# Patient Record
Sex: FEMALE | Race: WHITE | Employment: FULL TIME | URBAN - METROPOLITAN AREA
[De-identification: names, ages, dates, MRNs, and addresses within clinical notes are randomized per-mention and may not be internally consistent; named-entity substitution may affect disease eponyms.]

---

## 2023-09-17 PROBLEM — E03.9 ACQUIRED HYPOTHYROIDISM: Status: ACTIVE | Noted: 2023-09-17

## 2023-09-17 PROBLEM — E04.1 THYROID NODULE: Status: ACTIVE | Noted: 2023-09-17

## 2023-09-17 PROBLEM — M25.561 RIGHT KNEE PAIN: Status: ACTIVE | Noted: 2023-09-17

## 2023-09-17 PROBLEM — K21.9 ACID REFLUX: Status: ACTIVE | Noted: 2023-09-17

## 2023-09-17 PROBLEM — R00.2 HEART PALPITATIONS: Status: ACTIVE | Noted: 2023-09-17

## 2023-09-17 PROBLEM — M17.0 PRIMARY OSTEOARTHRITIS OF BOTH KNEES: Status: ACTIVE | Noted: 2023-09-17

## 2023-09-17 PROBLEM — M25.562 LEFT KNEE PAIN: Status: ACTIVE | Noted: 2023-09-17

## 2023-09-17 PROBLEM — M19.90 ARTHRITIS: Status: ACTIVE | Noted: 2023-09-17

## 2023-09-17 RX ORDER — LORATADINE 10 MG/1
TABLET ORAL
COMMUNITY
Start: 2022-01-26

## 2023-09-17 RX ORDER — HYALURONATE SODIUM 20 MG/2 ML
SYRINGE (ML) INTRAARTICULAR
COMMUNITY
Start: 2021-07-13

## 2023-09-17 RX ORDER — OMEPRAZOLE 20 MG/1
CAPSULE, DELAYED RELEASE ORAL
COMMUNITY
Start: 2022-01-20

## 2023-09-17 RX ORDER — PANTOPRAZOLE SODIUM 40 MG/1
40 TABLET, DELAYED RELEASE ORAL DAILY
COMMUNITY
Start: 2015-11-10

## 2023-09-17 RX ORDER — IBUPROFEN 800 MG/1
800 TABLET ORAL 3 TIMES DAILY
COMMUNITY

## 2024-02-28 ENCOUNTER — OFFICE VISIT (OUTPATIENT)
Dept: ORTHOPEDIC SURGERY | Facility: CLINIC | Age: 59
End: 2024-02-28

## 2024-02-28 DIAGNOSIS — M25.562 ACUTE PAIN OF LEFT KNEE: Primary | ICD-10-CM

## 2024-02-28 PROCEDURE — 20610 DRAIN/INJ JOINT/BURSA W/O US: CPT | Performed by: ORTHOPAEDIC SURGERY

## 2024-02-28 PROCEDURE — 99214 OFFICE O/P EST MOD 30 MIN: CPT | Performed by: ORTHOPAEDIC SURGERY

## 2024-02-28 RX ORDER — TRIAMCINOLONE ACETONIDE 40 MG/ML
1 INJECTION, SUSPENSION INTRA-ARTICULAR; INTRAMUSCULAR
Status: COMPLETED | OUTPATIENT
Start: 2024-02-28 | End: 2024-02-28

## 2024-02-28 RX ADMIN — TRIAMCINOLONE ACETONIDE 1 ML: 40 INJECTION, SUSPENSION INTRA-ARTICULAR; INTRAMUSCULAR at 09:46

## 2024-02-28 NOTE — PROGRESS NOTES
This is a consultation from Dr. Jadiel Davis DO for   Chief Complaint   Patient presents with    Right Knee - Pain    Left Knee - Pain       This is a 58 y.o. female who presents for evaluation of left knee pain.  Patient states she has had left knee pain for a long time, this is a chronic issue but has been exacerbated.  She claims a sharp stabbing pain over the medial and anterior knee worse with walking.  She has difficulty bending it.  Significant problems with chest dyspnea when she has to stand for long periods of time.  She has had injections in the past, cortisone has been helpful gel does not help much.  No numbness or tingling no fevers or chills no shooting pain down the leg.    Physical Exam    There has been no interval change in this patient's past medical, surgical, medications, allergies, family history or social history since the most recent visit to a provider within our department. 14 point review of systems was performed, reviewed, and negative except for pertinent positives documented in the history of present illness.     Constitutional: well developed, well nourished female in no acute distress  Psychiatric: normal mood, appropriate affect  Eyes: sclera anicteric  HENT: normocephalic/atraumatic  CV: regular rate and rhythm   Respiratory: non labored breathing  Integumentary: no rash  Neurological: moves all extremities    Left knee exam: skin intact no lacerations or abrations.  1+ effusion.  Tender medial joint line. negative log roll negative patellar grind. ROM 0-120. stable to varus and valgus stress at 0 and 30 degrees. negative lachman negative posterior drawer negative gurpreet. 5/5 ehl/fhl/gs/ta. silt s/s/sp/dp/t. 2+ dp/pt        Xrays were ordered by me, they were reviewed and independently interpreted by me today, they show severe degenerative disease bone-on-bone arthritis    L Inj/Asp: L knee on 2/28/2024 9:46 AM  Indications: pain and joint swelling  Details: 22 G needle,  anterolateral approach  Medications: 1 mL triamcinolone acetonide 40 mg/mL    Discussion:  I discussed the conservative treatment options for knee osteoarthritis including but not limited to physical therapy, oral NSAIDS, activity and lifestyle modification, and corticosteroid injections. Pt has elected to undergo a cortisone injection today. I have explained the risk and benefits of an injection including the possibility of joint infection, bleeding, damage to cartilage, allergic reaction. Patient verbalized understanding and gave verbal consent wishes to proceed with a intra-articular cortisone injection for their knee.    Procedure:  After discussing the risk and benefits of the procedure, we proceeded with an intra-articular left knee injection. We discussed the risks and benefits and potential morbidity related to the treatment, and to the prescription medication administered in the injection    With the patient's informed verbal consent, the left knee was prepped in standard sterile fashion with Chlorhexidine. The skin was then anesthetized with ethyl chloride spray and cleaned again with Chlorhexidine. The knee was then apirated/injected with a prefilled 20-gauge syringe of 40 mg Kenalog + 4 ml Lidocaine using the lateral approach without complications.  The patient tolerated this well and felt immediate initial relief of symptoms. A bandaid was applied and the patient ambulated out of the clinic on ther own accord without difficulty. Patient was instructed to avoid physical activity for 24-48 hours to prevent the knees from swelling and may ice the knees as tolerated. Patient should contact the office if any signs of of infection appear: redness, fever, chills, drainage, swelling or warmth to the knees.  Pt understands that the injections can be repeated no sooner than 3 months.  Procedure, treatment alternatives, risks and benefits explained, specific risks discussed. Consent was given by the patient.  "Immediately prior to procedure a time out was called to verify the correct patient, procedure, equipment, support staff and site/side marked as required. Patient was prepped and draped in the usual sterile fashion.             Impression/Plan: This is a 58 y.o. female with severe left knee arthritis.  I had an in depth discussion with the patient regarding treatment options for arthritis and their relative risks and benefits. We reviewed surgical and nonsurgical option for treatment. Treatments include anti inflammatory medications, physical therapy, weight loss, activity modification, use of assistive devices, injection therapies. We discussed current prescriptions and risks and benefits of continuation of prescription medication as apporpriate. We discussed that arthritis is often progressive over time, an in end stage arthritis surgical interventions can be considered, including arthroplasty. All questions were answered and the patient voiced their understanding.  She is considering left total knee in the future, I will see her back when she is ready to schedule or for further injections if she desires    BMI Readings from Last 1 Encounters:   03/28/23 31.80 kg/m²      No results found for: \"CREATININE\"  Tobacco Use: Not on file      Computed MELD 3.0 unavailable. Necessary lab results were not found in the last year.  Computed MELD-Na unavailable. Necessary lab results were not found in the last year.       No results found for: \"HGBA1C\"  No results found for: \"STAPHMRSASCR\"  "

## 2024-02-28 NOTE — LETTER
February 28, 2024     Jadiel Davis DO  [address] [facility] [address]    Patient: Elizabeth Tran   YOB: 1965   Date of Visit: 2/28/2024       Dear Dr. Jadiel Davis DO:    Thank you for referring Elizabeth rTan to me for evaluation. Below are my notes for this consultation.  If you have questions, please do not hesitate to call me. I look forward to following your patient along with you.       Sincerely,     Abner Amezcua MD      CC: No Recipients  ______________________________________________________________________________________    This is a consultation from Dr. Jadiel Davis DO for   Chief Complaint   Patient presents with   • Right Knee - Pain   • Left Knee - Pain       This is a 58 y.o. female who presents for evaluation of left knee pain.  Patient states she has had left knee pain for a long time, this is a chronic issue but has been exacerbated.  She claims a sharp stabbing pain over the medial and anterior knee worse with walking.  She has difficulty bending it.  Significant problems with chest dyspnea when she has to stand for long periods of time.  She has had injections in the past, cortisone has been helpful gel does not help much.  No numbness or tingling no fevers or chills no shooting pain down the leg.    Physical Exam    There has been no interval change in this patient's past medical, surgical, medications, allergies, family history or social history since the most recent visit to a provider within our department. 14 point review of systems was performed, reviewed, and negative except for pertinent positives documented in the history of present illness.     Constitutional: well developed, well nourished female in no acute distress  Psychiatric: normal mood, appropriate affect  Eyes: sclera anicteric  HENT: normocephalic/atraumatic  CV: regular rate and rhythm   Respiratory: non labored breathing  Integumentary: no rash  Neurological: moves all extremities    Left knee  exam: skin intact no lacerations or abrations.  1+ effusion.  Tender medial joint line. negative log roll negative patellar grind. ROM 0-120. stable to varus and valgus stress at 0 and 30 degrees. negative lachman negative posterior drawer negative gurpreet. 5/5 ehl/fhl/gs/ta. silt s/s/sp/dp/t. 2+ dp/pt        Xrays were ordered by me, they were reviewed and independently interpreted by me today, they show severe degenerative disease bone-on-bone arthritis    L Inj/Asp: L knee on 2/28/2024 9:46 AM  Indications: pain and joint swelling  Details: 22 G needle, anterolateral approach  Medications: 1 mL triamcinolone acetonide 40 mg/mL    Discussion:  I discussed the conservative treatment options for knee osteoarthritis including but not limited to physical therapy, oral NSAIDS, activity and lifestyle modification, and corticosteroid injections. Pt has elected to undergo a cortisone injection today. I have explained the risk and benefits of an injection including the possibility of joint infection, bleeding, damage to cartilage, allergic reaction. Patient verbalized understanding and gave verbal consent wishes to proceed with a intra-articular cortisone injection for their knee.    Procedure:  After discussing the risk and benefits of the procedure, we proceeded with an intra-articular left knee injection. We discussed the risks and benefits and potential morbidity related to the treatment, and to the prescription medication administered in the injection    With the patient's informed verbal consent, the left knee was prepped in standard sterile fashion with Chlorhexidine. The skin was then anesthetized with ethyl chloride spray and cleaned again with Chlorhexidine. The knee was then apirated/injected with a prefilled 20-gauge syringe of 40 mg Kenalog + 4 ml Lidocaine using the lateral approach without complications.  The patient tolerated this well and felt immediate initial relief of symptoms. A bandaid was applied  "and the patient ambulated out of the clinic on ther own accord without difficulty. Patient was instructed to avoid physical activity for 24-48 hours to prevent the knees from swelling and may ice the knees as tolerated. Patient should contact the office if any signs of of infection appear: redness, fever, chills, drainage, swelling or warmth to the knees.  Pt understands that the injections can be repeated no sooner than 3 months.  Procedure, treatment alternatives, risks and benefits explained, specific risks discussed. Consent was given by the patient. Immediately prior to procedure a time out was called to verify the correct patient, procedure, equipment, support staff and site/side marked as required. Patient was prepped and draped in the usual sterile fashion.             Impression/Plan: This is a 58 y.o. female with severe left knee arthritis.  I had an in depth discussion with the patient regarding treatment options for arthritis and their relative risks and benefits. We reviewed surgical and nonsurgical option for treatment. Treatments include anti inflammatory medications, physical therapy, weight loss, activity modification, use of assistive devices, injection therapies. We discussed current prescriptions and risks and benefits of continuation of prescription medication as apporpriate. We discussed that arthritis is often progressive over time, an in end stage arthritis surgical interventions can be considered, including arthroplasty. All questions were answered and the patient voiced their understanding.  She is considering left total knee in the future, I will see her back when she is ready to schedule or for further injections if she desires    BMI Readings from Last 1 Encounters:   03/28/23 31.80 kg/m²      No results found for: \"CREATININE\"  Tobacco Use: Not on file      Computed MELD 3.0 unavailable. Necessary lab results were not found in the last year.  Computed MELD-Na unavailable. Necessary lab " "results were not found in the last year.       No results found for: \"HGBA1C\"  No results found for: \"STAPHMRSASCR\"  "

## 2024-05-22 ENCOUNTER — OFFICE VISIT (OUTPATIENT)
Dept: ORTHOPEDIC SURGERY | Facility: CLINIC | Age: 59
End: 2024-05-22
Payer: COMMERCIAL

## 2024-05-22 DIAGNOSIS — M17.0 PRIMARY OSTEOARTHRITIS OF BOTH KNEES: Primary | ICD-10-CM

## 2024-05-22 PROCEDURE — 99214 OFFICE O/P EST MOD 30 MIN: CPT | Performed by: ORTHOPAEDIC SURGERY

## 2024-05-22 PROCEDURE — 20610 DRAIN/INJ JOINT/BURSA W/O US: CPT | Performed by: ORTHOPAEDIC SURGERY

## 2024-05-22 RX ORDER — TRIAMCINOLONE ACETONIDE 40 MG/ML
2.5 INJECTION, SUSPENSION INTRA-ARTICULAR; INTRAMUSCULAR
Status: COMPLETED | OUTPATIENT
Start: 2024-05-22 | End: 2024-05-22

## 2024-05-22 RX ADMIN — TRIAMCINOLONE ACETONIDE 2.5 MG: 40 INJECTION, SUSPENSION INTRA-ARTICULAR; INTRAMUSCULAR at 10:45

## 2024-05-22 NOTE — PROGRESS NOTES
This is a consultation from Dr. Jadiel Davis DO for   Chief Complaint   Patient presents with    Right Knee - Pain    Left Knee - Pain       This is a 58 y.o. female who presents for follow-up for bilateral knee pain.  Patient has bilateral knee arthritis, had cortisone injection several months back.  These are getting worse with her left knee, she had experienced significant exacerbation over the last few weeks.  Sharp pain over the medial knee worse with walking.  Similar symptoms in the right knee but not as bad.  No numbness no tingling no fevers no chills no shooting pain down the leg.  Not using brace right now but has used a cane when needed    Physical Exam    There has been no interval change in this patient's past medical, surgical, medications, allergies, family history or social history since the most recent visit to a provider within our department. 14 point review of systems was performed, reviewed, and negative except for pertinent positives documented in the history of present illness.     Constitutional: well developed, well nourished female in no acute distress  Psychiatric: normal mood, appropriate affect  Eyes: sclera anicteric  HENT: normocephalic/atraumatic  CV: regular rate and rhythm   Respiratory: non labored breathing  Integumentary: no rash  Neurological: moves all extremities    Bilateral knee exam: skin intact no lacerations or abrations.  1+ effusion.  Tender medial joint line. negative log roll negative patellar grind. ROM 0-120. stable to varus and valgus stress at 0 and 30 degrees. negative lachman negative posterior drawer negative gurpreet. 5/5 ehl/fhl/gs/ta. silt s/s/sp/dp/t. 2+ dp/pt        Xrays were ordered by me, they were reviewed and independently interpreted by me today, they show severe degenerative disease bone-on-bone arthritis bilateral knees    L Inj/Asp: bilateral knee on 5/22/2024 10:45 AM  Indications: pain and joint swelling  Details: 22 G needle, anterolateral  approach  Medications (Right): 2.5 mg triamcinolone acetonide 40 mg/mL  Medications (Left): 2.5 mg triamcinolone acetonide 40 mg/mL    Discussion:  I discussed the conservative treatment options for knee osteoarthritis including but not limited to physical therapy, oral NSAIDS, activity and lifestyle modification, and corticosteroid injections. Pt has elected to undergo a cortisone injection today. I have explained the risk and benefits of an injection including the possibility of joint infection, bleeding, damage to cartilage, allergic reaction. Patient verbalized understanding and gave verbal consent wishes to proceed with a intra-articular cortisone injection for their knee.    Procedure:  After discussing the risk and benefits of the procedure, we proceeded with an intra-articular bilateral knee injection. We discussed the risks and benefits and potential morbidity related to the treatment, and to the prescription medication administered in the injection    With the patient's informed verbal consent, the bilateral knees were prepped in standard sterile fashion with Chlorhexidine. The skin was then anesthetized with ethyl chloride spray and cleaned again with Chlorhexidine. The bilateral knees were then apirated/injected with a prefilled 20-gauge syringe of 40 mg Kenalog + 4 ml Lidocaine using the lateral approach without complications.  The patient tolerated this well and felt immediate initial relief of symptoms. A bandaid was applied and the patient ambulated out of the clinic on ther own accord without difficulty. Patient was instructed to avoid physical activity for 24-48 hours to prevent the knees from swelling and may ice the knees as tolerated. Patient should contact the office if any signs of of infection appear: redness, fever, chills, drainage, swelling or warmth to the knees.  Pt understands that the injections can be repeated no sooner than 3 months.      Procedure, treatment alternatives, risks and  "benefits explained, specific risks discussed. Consent was given by the patient. Immediately prior to procedure a time out was called to verify the correct patient, procedure, equipment, support staff and site/side marked as required. Patient was prepped and draped in the usual sterile fashion.             Impression/Plan: This is a 58 y.o. female with severe bilateral knee arthritis.  I had an in depth discussion with the patient regarding treatment options for arthritis and their relative risks and benefits. We reviewed surgical and nonsurgical option for treatment. Treatments include anti inflammatory medications, physical therapy, weight loss, activity modification, use of assistive devices, injection therapies. We discussed current prescriptions and risks and benefits of continuation of prescription medication as apporpriate. We discussed that arthritis is often progressive over time, an in end stage arthritis surgical interventions can be considered, including arthroplasty. All questions were answered and the patient voiced their understanding.  She is considering surgery in the left knee later this year, I will see her back to schedule the    BMI Readings from Last 1 Encounters:   03/28/23 31.80 kg/m²      No results found for: \"CREATININE\"  Tobacco Use: Low Risk  (3/6/2023)    Received from [facility]    Patient History     Smoking Tobacco Use: Never     Smokeless Tobacco Use: Never     Passive Exposure: Not on file      Computed MELD 3.0 unavailable. One or more values for this score either were not found within the given timeframe or did not fit some other criterion.  Computed MELD-Na unavailable. One or more values for this score either were not found within the given timeframe or did not fit some other criterion.       No results found for: \"HGBA1C\"  No results found for: \"STAPHMRSASCR\"  "

## 2024-05-22 NOTE — LETTER
May 22, 2024     Jadiel Davis DO  [address] [facility] [address]    Patient: Elizabeth Tran   YOB: 1965   Date of Visit: 5/22/2024       Dear Dr. Jadiel Davis DO:    Thank you for referring Elizabeth Trna to me for evaluation. Below are my notes for this consultation.  If you have questions, please do not hesitate to call me. I look forward to following your patient along with you.       Sincerely,     Abner Amezcua MD      CC: No Recipients  ______________________________________________________________________________________    This is a consultation from Dr. Jadiel Davis DO for   Chief Complaint   Patient presents with   • Right Knee - Pain   • Left Knee - Pain       This is a 58 y.o. female who presents for follow-up for bilateral knee pain.  Patient has bilateral knee arthritis, had cortisone injection several months back.  These are getting worse with her left knee, she had experienced significant exacerbation over the last few weeks.  Sharp pain over the medial knee worse with walking.  Similar symptoms in the right knee but not as bad.  No numbness no tingling no fevers no chills no shooting pain down the leg.  Not using brace right now but has used a cane when needed    Physical Exam    There has been no interval change in this patient's past medical, surgical, medications, allergies, family history or social history since the most recent visit to a provider within our department. 14 point review of systems was performed, reviewed, and negative except for pertinent positives documented in the history of present illness.     Constitutional: well developed, well nourished female in no acute distress  Psychiatric: normal mood, appropriate affect  Eyes: sclera anicteric  HENT: normocephalic/atraumatic  CV: regular rate and rhythm   Respiratory: non labored breathing  Integumentary: no rash  Neurological: moves all extremities    Bilateral knee exam: skin intact no lacerations or  Patient is in the lateral left side position.   The body was positioned using the following devices: wedge.   abrations.  1+ effusion.  Tender medial joint line. negative log roll negative patellar grind. ROM 0-120. stable to varus and valgus stress at 0 and 30 degrees. negative lachman negative posterior drawer negative gurpreet. 5/5 ehl/fhl/gs/ta. silt s/s/sp/dp/t. 2+ dp/pt        Xrays were ordered by me, they were reviewed and independently interpreted by me today, they show severe degenerative disease bone-on-bone arthritis bilateral knees    L Inj/Asp: bilateral knee on 5/22/2024 10:45 AM  Indications: pain and joint swelling  Details: 22 G needle, anterolateral approach  Medications (Right): 2.5 mg triamcinolone acetonide 40 mg/mL  Medications (Left): 2.5 mg triamcinolone acetonide 40 mg/mL    Discussion:  I discussed the conservative treatment options for knee osteoarthritis including but not limited to physical therapy, oral NSAIDS, activity and lifestyle modification, and corticosteroid injections. Pt has elected to undergo a cortisone injection today. I have explained the risk and benefits of an injection including the possibility of joint infection, bleeding, damage to cartilage, allergic reaction. Patient verbalized understanding and gave verbal consent wishes to proceed with a intra-articular cortisone injection for their knee.    Procedure:  After discussing the risk and benefits of the procedure, we proceeded with an intra-articular bilateral knee injection. We discussed the risks and benefits and potential morbidity related to the treatment, and to the prescription medication administered in the injection    With the patient's informed verbal consent, the bilateral knees were prepped in standard sterile fashion with Chlorhexidine. The skin was then anesthetized with ethyl chloride spray and cleaned again with Chlorhexidine. The bilateral knees were then apirated/injected with a prefilled 20-gauge syringe of 40 mg Kenalog + 4 ml Lidocaine using the lateral approach without complications.  The patient  "tolerated this well and felt immediate initial relief of symptoms. A bandaid was applied and the patient ambulated out of the clinic on ther own accord without difficulty. Patient was instructed to avoid physical activity for 24-48 hours to prevent the knees from swelling and may ice the knees as tolerated. Patient should contact the office if any signs of of infection appear: redness, fever, chills, drainage, swelling or warmth to the knees.  Pt understands that the injections can be repeated no sooner than 3 months.      Procedure, treatment alternatives, risks and benefits explained, specific risks discussed. Consent was given by the patient. Immediately prior to procedure a time out was called to verify the correct patient, procedure, equipment, support staff and site/side marked as required. Patient was prepped and draped in the usual sterile fashion.             Impression/Plan: This is a 58 y.o. female with severe bilateral knee arthritis.  I had an in depth discussion with the patient regarding treatment options for arthritis and their relative risks and benefits. We reviewed surgical and nonsurgical option for treatment. Treatments include anti inflammatory medications, physical therapy, weight loss, activity modification, use of assistive devices, injection therapies. We discussed current prescriptions and risks and benefits of continuation of prescription medication as apporpriate. We discussed that arthritis is often progressive over time, an in end stage arthritis surgical interventions can be considered, including arthroplasty. All questions were answered and the patient voiced their understanding.  She is considering surgery in the left knee later this year, I will see her back to schedule the    BMI Readings from Last 1 Encounters:   03/28/23 31.80 kg/m²      No results found for: \"CREATININE\"  Tobacco Use: Low Risk  (3/6/2023)    Received from [facility]    Patient History    • Smoking Tobacco " "Use: Never    • Smokeless Tobacco Use: Never    • Passive Exposure: Not on file      Computed MELD 3.0 unavailable. One or more values for this score either were not found within the given timeframe or did not fit some other criterion.  Computed MELD-Na unavailable. One or more values for this score either were not found within the given timeframe or did not fit some other criterion.       No results found for: \"HGBA1C\"  No results found for: \"STAPHMRSASCR\"  "

## 2024-05-31 ENCOUNTER — LAB (OUTPATIENT)
Dept: LAB | Facility: LAB | Age: 59
End: 2024-05-31
Payer: COMMERCIAL

## 2024-06-01 LAB — COTININE UR QL SCN: NEGATIVE

## 2024-08-28 ENCOUNTER — HOSPITAL ENCOUNTER (OUTPATIENT)
Dept: RADIOLOGY | Facility: CLINIC | Age: 59
Discharge: HOME | End: 2024-08-28
Payer: COMMERCIAL

## 2024-08-28 ENCOUNTER — APPOINTMENT (OUTPATIENT)
Dept: ORTHOPEDIC SURGERY | Facility: CLINIC | Age: 59
End: 2024-08-28
Payer: COMMERCIAL

## 2024-08-28 ENCOUNTER — HOSPITAL ENCOUNTER (OUTPATIENT)
Facility: HOSPITAL | Age: 59
Setting detail: OUTPATIENT SURGERY
End: 2024-08-28
Attending: ORTHOPAEDIC SURGERY | Admitting: ORTHOPAEDIC SURGERY
Payer: COMMERCIAL

## 2024-08-28 DIAGNOSIS — M25.562 CHRONIC PAIN OF LEFT KNEE: Primary | ICD-10-CM

## 2024-08-28 DIAGNOSIS — G89.29 CHRONIC PAIN OF LEFT KNEE: Primary | ICD-10-CM

## 2024-08-28 DIAGNOSIS — G89.29 CHRONIC PAIN OF LEFT KNEE: ICD-10-CM

## 2024-08-28 DIAGNOSIS — M25.562 CHRONIC PAIN OF LEFT KNEE: ICD-10-CM

## 2024-08-28 PROCEDURE — 73564 X-RAY EXAM KNEE 4 OR MORE: CPT | Mod: LT

## 2024-08-28 PROCEDURE — 1036F TOBACCO NON-USER: CPT | Performed by: ORTHOPAEDIC SURGERY

## 2024-08-28 PROCEDURE — 99215 OFFICE O/P EST HI 40 MIN: CPT | Performed by: ORTHOPAEDIC SURGERY

## 2024-08-28 RX ORDER — ACETAMINOPHEN 325 MG/1
975 TABLET ORAL ONCE
OUTPATIENT
Start: 2024-08-28 | End: 2024-08-28

## 2024-08-28 RX ORDER — CELECOXIB 400 MG/1
400 CAPSULE ORAL ONCE
OUTPATIENT
Start: 2024-08-28 | End: 2024-08-28

## 2024-08-28 RX ORDER — CEFAZOLIN SODIUM 2 G/100ML
2 INJECTION, SOLUTION INTRAVENOUS ONCE
OUTPATIENT
Start: 2024-08-28 | End: 2024-08-28

## 2024-08-28 RX ORDER — SODIUM CHLORIDE, SODIUM LACTATE, POTASSIUM CHLORIDE, CALCIUM CHLORIDE 600; 310; 30; 20 MG/100ML; MG/100ML; MG/100ML; MG/100ML
20 INJECTION, SOLUTION INTRAVENOUS CONTINUOUS
OUTPATIENT
Start: 2024-08-28

## 2024-08-28 ASSESSMENT — PAIN - FUNCTIONAL ASSESSMENT: PAIN_FUNCTIONAL_ASSESSMENT: 0-10

## 2024-08-28 ASSESSMENT — PAIN SCALES - GENERAL: PAINLEVEL_OUTOF10: 8

## 2024-08-28 NOTE — PROGRESS NOTES
This is a consultation from Dr. Jadiel Davis DO for   Chief Complaint   Patient presents with    Left Knee - Pain       This is a 58 y.o. female who presents for evaluation of left knee pain.  She had an injection several months ago, did not really work very well her pain is getting worse.  She is experience severe exacerbation of her left knee pain over the last few weeks, sharp pain over the medial knee worse with walking proving.  No numbness or tingling no fevers or chills.  She has had extensive trial of nonsurgical management occluding use of anti-inflammatories physical therapy activity modification use of assistive device cortisone injections.  Despite that she has severe and worsening pain which impacts her quality of life and activities of daily living she like to consider total knee.    Physical Exam    There has been no interval change in this patient's past medical, surgical, medications, allergies, family history or social history since the most recent visit to a provider within our department. 14 point review of systems was performed, reviewed, and negative except for pertinent positives documented in the history of present illness.     Constitutional: well developed, well nourished female in no acute distress  Psychiatric: normal mood, appropriate affect  Eyes: sclera anicteric  HENT: normocephalic/atraumatic  CV: regular rate and rhythm   Respiratory: non labored breathing  Integumentary: no rash  Neurological: moves all extremities    Left knee exam: skin intact no lacerations or abrations.  1+ effusion.  Tender medial joint line. negative log roll negative patellar grind. ROM 5-100. stable to varus and valgus stress at 0 and 30 degrees. negative lachman negative posterior drawer negative gurpreet. 5/5 ehl/fhl/gs/ta. silt s/s/sp/dp/t. 2+ dp/pt        Xrays were ordered by me, they were reviewed and independently interpreted by me today, they show severe degenerative disease bone-on-bone arthritis  subchondral sclerosis osteophyte formation of the left knee, Kellgren-Cole grade 4    Procedures      Impression/Plan: This is a 58 y.o. female with severe left knee arthritis that has failed nonoperative management.  Patient like to proceed with left total knee.    I had an extensive discussion with the patient regarding her condition and possible treatment options. Nonsurgical treatment for left knee arthritis includes activity modification, weight loss, use of a cane or other assistive device, pain medications and nonsteroidal anti-inflammatory medications, joint injections, and physical therapy. The patient has attempted non-surgical treatment for this condition for greater than 6 months and it has failed. We discussed the risks benefits and alternatives of total knee arthroplasty. Benefits of joint replacement include: Relief of pain, improvement of function, and improved quality of life. Alternatives include observation and watchful waiting, and the nonsurgical modalities noted above.     Discussed with the patient that during total knee arthroplasty prosthetic resurfacing of the patella may or may not be performed at the discretion of thesurgeon during surgery. In the event that prosthetic patellar resurfacing is not performed, nonprosthetic arthroplasty will be performed with removal of bone spurs, circumferential synovectomy and electrocautery. Patient was informed that anterior knee pain and patellofemoral crepitus can potentially occur after knee surgery with or without prosthetic patellar resurfacing.     Patient is at high risk for venous thromboemboli.  This is because of a history of previous venous thromboemboli and/or a genetic condition that predisposes them to venous thromboemboli.  Discussed the risks with the patient.  We will plan for risk stratified VTE prophylaxis postoperatively.        We discussed the risks of complications as well as the risks of morbidity and mortality related to  surgical treatment with total joint replacement. We reviewed the fact that total joint replacement is major elective surgery with significant associated surgical and procedural risk factors as detailed below.   Risks include: Pain, blood loss, damage to nearby anatomical structures including but not limited to nerves or blood vessels muscles tendons and bone, failure surgery to ameliorate symptoms, persistence of pain surrounding the affected joint, mechanical failure of the prosthesis including but not limited to loosening of the prosthesis from bone ,breakage of the prosthesis and dislocation of the prosthesis, change in length or appearance of a limb, infection possibly necessitating further surgery, removal of the prosthesis, or limb amputation, the need for additional surgery for other reasons, blood clots, amputation, and death. No guarantees were implied or given.  All questions were answered and the patient voiced their understanding.     A complete set of surgical instructions was given to the patient. The patient was educated regarding preoperative nutrition, preparation of their home for postoperative rehabilitation, choosing a care partner, medical and dental clearance, the cessation of medications that can cause bleeding or presenting to risk for infection, chlorhexidine bath, nasal swab and decontamination, post operative follow up, and need for medical equipment. Patient was also educated regarding the possibility of same day surgery and related criteria and protocols. The patient will attend our joint class further education, and thereafter they will return for a preoperative visit. A presurgery education booklet was also given to the patient.     surgical plan: Left total knee  implants: DePuy  approach: Standard  DVT ppx aspirin  drugs to stop none none  allergy to abx none  post operative abx: ancef +/- vanc (per protocol)  special clearance needed none    BMI Readings from Last 1 Encounters:  "  03/28/23 31.80 kg/m²      No results found for: \"CREATININE\"  Tobacco Use: Low Risk  (8/28/2024)    Patient History     Smoking Tobacco Use: Never     Smokeless Tobacco Use: Never     Passive Exposure: Not on file      Computed MELD 3.0 unavailable. One or more values for this score either were not found within the given timeframe or did not fit some other criterion.  Computed MELD-Na unavailable. One or more values for this score either were not found within the given timeframe or did not fit some other criterion.       No results found for: \"HGBA1C\"  No results found for: \"STAPHMRSASCR\"  "

## 2024-09-03 ENCOUNTER — TELEPHONE (OUTPATIENT)
Dept: ORTHOPEDIC SURGERY | Facility: CLINIC | Age: 59
End: 2024-09-03
Payer: COMMERCIAL

## 2024-09-03 NOTE — TELEPHONE ENCOUNTER
LVM, checking with patient if she would like to go through the [facility] program and get scheduled at [facility] or if she's already met her deductible and wants to stay at [facility] she can do that as well was just following up on this, awaiting return call

## 2024-09-05 ENCOUNTER — TELEPHONE (OUTPATIENT)
Dept: ORTHOPEDIC SURGERY | Facility: CLINIC | Age: 59
End: 2024-09-05
Payer: COMMERCIAL

## 2024-09-05 NOTE — TELEPHONE ENCOUNTER
11/21/24 lt tka  Patient called her insurance and discussed her deductible which is $1700 she is currently at $1100 for that. She wants to know whether she should proceed with the [facility] or do [facility]. She also mentioned maybe her visit today would count towards her deductible. She spoke with you today and you told her to call back.

## 2024-09-09 ENCOUNTER — TELEPHONE (OUTPATIENT)
Dept: ORTHOPEDIC SURGERY | Facility: CLINIC | Age: 59
End: 2024-09-09
Payer: COMMERCIAL

## 2024-09-09 NOTE — TELEPHONE ENCOUNTER
Pt. Was told to call Tonya angela: location for surgery in November.  She has chosen [facility]..

## 2024-09-10 DIAGNOSIS — M25.562 CHRONIC PAIN OF LEFT KNEE: Primary | ICD-10-CM

## 2024-09-10 DIAGNOSIS — M17.12 ARTHRITIS OF LEFT KNEE: ICD-10-CM

## 2024-09-10 DIAGNOSIS — G89.29 CHRONIC PAIN OF LEFT KNEE: Primary | ICD-10-CM

## 2024-09-11 PROBLEM — M17.12 ARTHRITIS OF LEFT KNEE: Status: ACTIVE | Noted: 2024-09-10

## 2024-09-23 ENCOUNTER — TELEPHONE (OUTPATIENT)
Dept: ORTHOPEDIC SURGERY | Facility: CLINIC | Age: 59
End: 2024-09-23
Payer: COMMERCIAL

## 2024-09-23 DIAGNOSIS — M25.562 ACUTE PAIN OF LEFT KNEE: ICD-10-CM

## 2024-09-23 NOTE — TELEPHONE ENCOUNTER
Pt called about surgery paperwork that was to be sent a couple weeks ago land she ever received.  She only wants to speak with a nurse. Please call patient.

## 2024-09-23 NOTE — TELEPHONE ENCOUNTER
Left message I do not have paperwork on this patient,   We do not have anything in the chart   Where was this paperwork sent?   What company did it come from?  If we could have it sent again to [employer] Fax [phone]- we will get it completed

## 2024-09-24 NOTE — TELEPHONE ENCOUNTER
11/25/24 lt tka  Patient wants to know if Dr. Amezcua can send something over for pain. I instructed patient we cannot send over any pain meds but we could see about getting a NSAID? Patient did not have any work paperwork at this time it was something Tonya stated in regards to her upcoming surgery. She is part of the MERLE program and I assume that is the paperwork Tonya was going to be sending out?    Pharmacy: [facility]en Avalos

## 2024-09-25 NOTE — TELEPHONE ENCOUNTER
"Left message for patient Dr. Amezcua will send in Meloxicam to pharmacy.   She will need to stop taking this medication and all other NSAIDS 1 week prior to surgery       [facility]LE information \"paperwork\" was coming from [employer]. I will reach out to see if she can resend info to the patient.   "

## 2024-09-26 RX ORDER — MELOXICAM 15 MG
15 TABLET ORAL DAILY
Qty: 60 TABLET | Refills: 0 | Status: SHIPPED | OUTPATIENT
Start: 2024-09-26 | End: 2024-11-25

## 2024-10-03 ENCOUNTER — APPOINTMENT (OUTPATIENT)
Dept: ORTHOPEDIC SURGERY | Facility: CLINIC | Age: 59
End: 2024-10-03
Payer: COMMERCIAL

## 2024-10-03 PROCEDURE — E0143 WALKER FOLDING WHEELED W/O S: HCPCS | Performed by: ORTHOPAEDIC SURGERY

## 2024-10-03 NOTE — GROUP NOTE
In addition to the group class activities, Elizabeth Tran had the following lab work completed:  No orders of the defined types were placed in this encounter.      A new History and Physical was not completed.    This class lasted approximately 1 hour and had 7 participants. The nurse instructor covered the following topics:  Overview of joint replacement surgery.  Instructions for at-home preparation for surgery (included below).  Expectations before and after surgery including hospital stay.  Discharge planning and home care options.  Physical therapy overview and review of at-home exercises.    Information for Surgery or Hospital Stay  For morning surgery, do not eat or drink after midnight. This includes water, mints, and chewing gum.  For afternoon surgery, you may have a clear liquid breakfast before 6 A.M. Clear liquids are anything you can see through, like apple juice, cranberry juice, tea or black coffee without cream. Do not eat or drink after 6 A.M. This includes water.  Wear loose fitting clothes--something that can be slipped on and off easily over a dressing.  Bring a walker or crutches with you to the hospital on the day of surgery.  Please inform the office if you have any symptoms of illness or fever prior to surgery.  You need to have transportation home when discharged, as you will be medicated.  STOP any aspirin or anti-inflammatory products (Aleve, Advil, etc.) 5-7 days before surgery.  You may use Tylenol or Extra Strength Tylenol.  STOP any vitamins or herbal products 10 days before surgery.

## 2024-10-03 NOTE — PROGRESS NOTES
Pre Surgery Discharge Planning :    Procedure:  TKA    Date of procedure:  11/25/2024    Address you will be discharged to:  Address   [address] [place] [id number]       Care Partner:  CRISTI / SON    Current mobility status:  ID    Stairs into house: YES    Stairs inside house: NO    DME:   [facility]    Joint Class:  10/3/2024    Same Day Surgery:  YES

## 2024-10-11 NOTE — TELEPHONE ENCOUNTER
Patient called back regarding PAT, she does not have the paperwork. She spoke with you yesterday and she was told to check her stuff and see if she had it. She does not. Wants that sent out with [facility]. She has paperwork for [facility] and her surgery is at [facility].

## 2024-10-24 ENCOUNTER — TELEPHONE (OUTPATIENT)
Dept: ORTHOPEDIC SURGERY | Facility: CLINIC | Age: 59
End: 2024-10-24
Payer: COMMERCIAL

## 2024-10-24 NOTE — TELEPHONE ENCOUNTER
Patient called and has an upcoming surgery on 11/25/24 lt knee tka. patient stated she needs to change the beginning date of her leave to the 11/25/24   Patient is unsure if the paperwork has reached the office yet she would like a call back . She stated she originally wanted to take a few days off before surgery and is not able to.

## 2024-10-25 NOTE — TELEPHONE ENCOUNTER
Patient called back and left a message that she has not heard anything back regarding her message she left yesterday. Would like a call back. Patient is very upset regarding her leave as they're giving her a hard time in pay roll and being a holiday week that she is having surgery she wants to make sure this is all in order. The paperwork would have come from [employer]. If we do not have it, let her know and she will try and get it to us.

## 2024-10-25 NOTE — TELEPHONE ENCOUNTER
I called patient, I do not have the paperwork from [employer]  Patient was not sure if she was supposed to get it and drop it off or if it would be sent to our office.  I explained generally once she notifies [employer] that usually starts the process and they send us paperwork.   She did speak to [facility] this morning we should receive paperwork soon    Start date will be 11/25/24 returning to work 1/20/25.     She was not upset with our office, she is upset with payroll with her employer.     Once we receive this paperwork we will work on completing it. I did let her know we have 7-10 business to complete, I will try to get hers done sooner since we have been having an issue getting the papers

## 2024-11-06 ENCOUNTER — APPOINTMENT (OUTPATIENT)
Dept: PREADMISSION TESTING | Facility: HOSPITAL | Age: 59
End: 2024-11-06
Payer: COMMERCIAL

## 2024-11-07 ENCOUNTER — APPOINTMENT (OUTPATIENT)
Dept: PREADMISSION TESTING | Facility: HOSPITAL | Age: 59
End: 2024-11-07
Payer: COMMERCIAL

## 2024-11-08 NOTE — PREPROCEDURE INSTRUCTIONS
Thank you for visiting [facility] today for your pre-procedure evaluation, you were seen by     Ada Rivera CNP  Pre Admission Testing  [facility]  [phone]    This summary includes instructions and information to aid you during your perioperative period.  Please read carefully. If you have any questions about your visit today, please call the number listed above.  If you become ill or have any changes to your health before your surgery, please contact your primary care provider and alert your surgeon.      Preparing for your Surgery       Exercises  Preoperative Deep Breathing Exercises  Why it is important to do deep breathing exercises before my surgery?  Deep breathing exercises strengthen your breathing muscles.  This helps you to recover after your surgery and decreases the chance of breathing complications.  How are the deep breathing exercises done?  Sit straight with your back supported.  Breathe in deeply and slowly through your nose. Your lower rib cage should expand and your abdomen may move forward.  Hold that breath for 3 to 5 seconds.  Breathe out through pursed lips, slowly and completely.  Rest and repeat 10 times every hour while awake.  Rest longer if you become dizzy or lightheaded.       Preoperative Brain Exercises    What are brain exercises?  A brain exercise is any activity that engages your thinking (cognitive) skills.    What types of activities are considered brain exercises?  Jigsaw puzzles, crossword puzzles, word jumble, memory games, word search, and many more.  Many can be found free online or on your phone via a mobile chaim.    Why should I do brain exercises before my surgery?  More recent research has shown brain exercise before surgery can lower the risk of postoperative delirium (confusion) which can be especially important for older adults.  Patients who did brain exercises for 5 to 10 hours the days before surgery, cut their risk  of postoperative delirium in half up to 1 week after surgery.    Sit-to-Stand Exercise    What is the sit-to-stand exercise?  The sit-to-stand exercise strengthens the muscles of your lower body and muscles in the center of your body (core muscles for stability) helping to maintain and improve your strength and mobility.  How do I do the sit-to-stand exercise?  The goal is to do this exercise without using your arms or hands.  If this is too difficult, use your arms and hands or a chair with armrests to help slowly push yourself to the standing position and lower yourself back to the sitting position. As the movement becomes easier use your arms and hands less.    Steps to the sit-to-stand exercise  Sit up tall in a sturdy chair, knees bent, feet flat on the floor shoulder-width apart.  Shift your hips/pelvis forward in the chair to correctly position yourself for the next movement.  Lean forward at your hips.  Stand up straight putting equal weight on both feet.  Check to be sure you are properly aligned with the chair, in a slow controlled movement sit back down.  Repeat this exercise 10-15 times.  If needed you can do it fewer times until your strength improves.  Rest for 1 minute.  Do another 10-15 sit-to-stand exercises.  Try to do this in the morning and evening.        Instructions    Preoperative Fasting Guidelines    Why must I stop eating and drinking near surgery time?  With sedation, food or liquid in your stomach can enter your lungs causing serious complications  Food can increase nausea and vomiting  When do I need to stop eating and drinking before my surgery?      Do not eat any food after midnight the night before your surgery/procedure. You may have up to 13.5 ounces of clear liquid until TWO hours before your instructed arrival time to the hospital.  This includes water, black tea/coffee, (no milk or cream) apple juice, and electrolyte drinks (Gatorade). You may chew gum until TWO hours before  your surgery/procedure            Simple things you can do to help prevent blood clots     Blood clots are blockages that can form in the body's veins. When a blood clot forms in your deep veins, it may be called a deep vein thrombosis, or DVT for short. Blood clots can happen in any part of the body where blood flows, but they are most common in the arms and legs. If a piece of a blood clot breaks free and travels to the lungs, it is called a pulmonary embolus (PE). A PE can be a very serious problem.         Being in the hospital or having surgery can raise your chances of getting a blood clot because you may not be well enough to move around as much as you normally do.         Ways you can help prevent blood clots in the hospital       Wearing SCDs  SCDs stands for Sequential Compression Devices.   SCDs are special sleeves that wrap around your legs. They attach to a pump that fills them with air to gently squeeze your legs every few minutes.  This helps return the blood in your legs to your heart.   SCDs should only be taken off when walking or bathing. SCDs may not be comfortable, but they can help save your life.              Pump SCD leg sleeves  Wearing compression stockings - if your doctor orders them. These special snug-fitting stockings gently squeeze your legs to help blood flow.       Walking. Walking helps move the blood in your legs.   If your doctor says it is ok, try walking the halls at least   5 times a day. Ask us to help you get up, so you don't fall.      Taking any blood-thinning medicines your doctor orders.              Ways you can help prevent blood clots at home         Wearing compression stockings - if your doctor orders them.   Walking - to help move the blood in your legs.    Taking any blood-thinning medicines your doctor orders.      Signs of a blood clot or PE    Tell your doctor or nurse right away if you have any of the problems listed below.         If you are at home, seek  "medical care right away. Call 911 for chest pain or problems breathing.            Signs of a blood clot (DVT) - such as pain, swelling, redness, or warmth in your arm or legs.  Signs of a pulmonary embolism (PE) - such as chest pain or feeling short of breath      Tobacco and Alcohol;  Do not drink alcohol or smoke within 24 hours of surgery.  It is best to quit smoking for as long as possible before any surgery or procedure.    Other Instructions  Why did I have my nose, under my arms, and groin swabbed? The purpose of the swab is to identify Staphylococcus aureus inside your nose or on your skin.  The swab was sent to the laboratory for culture.  A positive swab/culture for Staphylococcus aureus is called colonization or carriage.     What is Staphylococcus aureus? Staphylococcus aureus, also known as \"staph\", is a germ found on the skin or in the nose of healthy people.  Sometimes Staphylococcus aureus can get into the body and cause an infection.  This can be minor (such as pimples, boils, or other skin problems).  It might also be serious (such as a blood infection, pneumonia, or a surgical site infection).     What is Staphylococcus aureus colonization or carriage? Colonization or carriage means that a person has the germ but is not sick from it.  These bacteria can be spread on the hands or when breathing or sneezing.   How is Staphylococcus aureus spread? It is most often spread by close contact with a person or item that carries it.   What happens if my culture is positive for Staphylococcus aureus? Your doctor/medical team will use this information to guide any antibiotic treatment which may be necessary.  Regardless of the culture results, we will clean the inside of your nose with a betadine swab just before you have your surgery.   Will I get an infection if I have Staphylococcus aureus in my nose or on my skin? Anyone can get an infection with Staphylococcus aureus.  However, the best way to reduce " your risk of infection is to follow the instructions provided to you for the use of your CHG soap and dental rinse.      Body Wash:     What is a home preoperative antibacterial shower? This shower is a way of cleaning the skin with a germ-killing solution before surgery.  The solution contains chlorhexidine, commonly known as CHG.  CHG is a skin cleanser with germ-killing ability.  Let your doctor know if you are allergic to chlorhexidine.   Why do I need to take a preoperative antibacterial shower? Skin is not sterile.  It is best to try to make your skin as free of germs as possible before surgery.  Proper cleansing with a germ-killing soap before surgery can lower the number of germs on your skin.  This helps to reduce the risk of infection at the surgical site.    Following the instructions listed below will help you prepare your skin for surgery.   How do I use the solution?  If you plan to wash your hair, use your regular shampoo; then rinse your hair and body thoroughly to remove any shampoo residue  Wash your face with your regular soap or water only  Thoroughly rinse your body with water from the neck down  Apply Hibiclens directly on your skin or on a wet washcloth and wash gently; move away from the shower stream when applying Hibiclens to avoid rinsing it off too soon  Rinse thoroughly with warm water and keep out of eyes, ears and mouth; if Hibiclens comes in contact with these areas, rinse out promptly  Dry your skin with a towel  Do not use your regular soap after applying and rinsing with Hibiclens  Do not apply lotions or deodorants to the cleaned body area      Oral/Dental Rinse:     What is oral/dental rinse?  It is a mouthwash. It is a way of cleaning the mouth with a germ-killing solution before your surgery.  The solution contains chlorhexidine, commonly known as CHG. It is used inside the mouth to kill a bacteria known as Staphylococcus aureus.  Let your doctor know if you are allergic to  Chlorhexidine.   Why do I need to use CHG oral/dental rinse? The CHG oral/dental rinse helps to kill a bacteria in your mouth known as Staphylococcus aureus.  This reduces the risk of infection at the surgical site.    Using your CHG oral/dental rinse STEPS: Use your CHG oral/dental rinse after you brush your teeth the night before (at bedtime) and the morning of your surgery.  Follow all directions on your prescription label.    Use the cap on the container to measure 15 ml.  Swish (gargle if you can) the mouthwash in your mouth for at least 30 seconds, (do not swallow) and spit out.  After you use your CHG rinse, do not rinse your mouth with water, drink or eat.    Please refer to the prescription label for the appropriate time to resume oral intake   What side effects might I have using the CHG oral/dental rinse? CHG rinse will stick to plaque on the teeth.  Brush and floss just before use.  Teeth brushing will help avoid staining of plaque during use.          The Week before Surgery        Seven days before Surgery  Check your PAT medication instructions  Do the exercises provided to you by [facility]  Arrange for a responsible, adult licensed  to take you home after surgery and stay with you for 24 hours.  You will not be permitted to drive yourself home if you have received any anesthetic/sedation  Six days before surgery  Check your PAT medication instructions  Do the exercises provided to you by PAT  Start using Chlorhexidene (CHG) body wash if prescribed  Five days before surgery  Check your PAT medication instructions  Do the exercises provided to you by PAT  Continue to use CHG body wash if prescribed  Three days before surgery  Check your PAT medication instructions  Do the exercises provided to you by PAT  Continue to use CHG body wash if prescribed  Two days before surgery  Check your [facility] medication instructions  Do the exercises provided to you by [facility]  Continue to use CHG body wash if  prescribed    The Day before Surgery       Check your PAT medication and all other PAT instructions including when to stop eating and drinking  You will be called with your arrival time for surgery in the late afternoon.  If you do not receive a call please reach out to [facility]. [phone]  Do not smoke or drink 24 hours before surgery  Prepare items to bring with you to the hospital  Shower with your chlorhexidine wash if prescribed  Brush your teeth and use your chlorhexidine dental rinse if prescribed    The Day of Surgery       Check your PAT medication instructions  Ensure you follow the instructions for when to stop eating and drinking  Shower, if prescribed use CHG.  Do not apply any lotions, creams, moisturizers, perfume or deodorant  Brush your teeth and use your CHG dental rinse if prescribed  Wear loose comfortable clothing  Avoid make-up  Remove  jewelry and piercings, consider professional piercing removal with a plastic spacer if needed  Bring photo ID and Insurance card  Bring an accurate medication list that includes medication dose, frequency and allergies  Bring a copy of your advanced directives (will, health care power of )  Bring any devices and controllers as well as medical devices you have been provided with for surgery (CPAP, slings, braces, etc.)  Dentures, eyeglasses, and contacts will be removed before surgery, please bring cases for contacts or glasses

## 2024-11-08 NOTE — CPM/PAT H&P
CPM/PAT Evaluation       Name: Elizabeth Tran (Elizabeth Tran)  /Age: 1965/59 y.o.     Visit Type:   In-Person       Chief Complaint: Chronic pain of left knee    HPI 60 y/o male scheduled for total knee arthroplasty on 2024 with  Dr. Amezcua secondary to chronic pain of left knee.  PMHX includes chronic pain of left knee.  PAT is consulted today for perioperative risk stratification and optimization.      Past Medical History:   Diagnosis Date    Arthritis     Personal history of transient ischemic attack (TIA), and cerebral infarction without residual deficits     History of stroke    Stroke (Multi)        Past Surgical History:   Procedure Laterality Date     SECTION, CLASSIC       Section x3    ORIF RADIUS & ULNA FRACTURES Left        Patient  has no history on file for sexual activity.    Family History   Problem Relation Name Age of Onset    Other (Lymphoma leukemia) Mother         Allergies   Allergen Reactions    Aspirin Hives and GI Upset    Morphine Itching       Prior to Admission medications    Medication Sig Start Date End Date Taking? Authorizing Provider   benzocaine (Orajel) 10 % mucosal gel Use in the mouth or throat.    Historical Provider, MD   ibuprofen 800 mg tablet Take 1 tablet (800 mg) by mouth 3 times a day.    Historical Provider, MD   loratadine (Claritin) 10 mg tablet Loratadine 10 MG Oral Tablet   Quantity: 30  Refills: 0        Start : 2022   Active 22   Historical Provider, MD   Mobic 15 mg tablet Take 1 tablet (15 mg) by mouth once daily. 24  Abner Amezcua MD   omeprazole (PriLOSEC) 20 mg DR capsule Omeprazole 20 MG Oral Capsule Delayed Release   Quantity: 60  Refills: 0        Start : 2022   Active 22   Historical Provider, MD   pantoprazole (ProtoNix) 40 mg EC tablet Take 1 tablet (40 mg) by mouth once daily. 11/10/15   Historical Provider, MD   sodium hyaluronate (Euflexxa) 10 mg/mL(mw 2.4 -3.6 million)  injection Euflexxa 20 MG/2ML Intra-articular Solution Prefilled Syringe   Quantity: 12  Refills: 0        Start : 13-Jul-2021   Active 7/13/21   Historical Provider, MD ALMAGUER ROS:   Constitutional:   neg    Neuro/Psych:   neg    Eyes:    use of corrective lenses  Ears:   neg    Nose:   Mouth:   Throat:   neg    Neck:   neg    Cardio:   neg    Respiratory:   neg    Endocrine:   GI:   neg    :   neg    Musculoskeletal:   neg    Hematologic:   neg    Skin:      Physical Exam  Vitals reviewed.   Constitutional:       Appearance: Normal appearance.   HENT:      Head: Normocephalic and atraumatic.      Mouth/Throat:      Mouth: Mucous membranes are moist.      Pharynx: Oropharynx is clear.   Eyes:      Extraocular Movements: Extraocular movements intact.      Pupils: Pupils are equal, round, and reactive to light.   Cardiovascular:      Rate and Rhythm: Normal rate and regular rhythm.   Pulmonary:      Effort: Pulmonary effort is normal.      Breath sounds: Normal breath sounds.   Abdominal:      General: Abdomen is flat.      Palpations: Abdomen is soft.   Musculoskeletal:         General: Normal range of motion.      Cervical back: Normal range of motion and neck supple.   Skin:     General: Skin is warm and dry.   Neurological:      General: No focal deficit present.      Mental Status: She is alert and oriented to person, place, and time.   Psychiatric:         Mood and Affect: Mood normal.         Behavior: Behavior normal.          Airway    Testing/Diagnostic:     Patient Specialist/PCP:     Visit Vitals  /77   Pulse 64   Temp 36.3 °C (97.3 °F) (Tympanic)   Resp 16       DASI Risk Score      Flowsheet Row Pre-Admission Testing from 11/11/2024 in [facility] Questionnaire Series Submission from 10/22/2024 in [facility] with Generic Provider Agnes   Can you take care of yourself (eat, dress, bathe, or use toilet)?  2.75 filed at 11/11/2024 0845 2.75  filed at 10/22/2024 0055   Can you  walk indoors, such as around your house? 1.75 filed at 11/11/2024 0845 1.75  filed at 10/22/2024 0055   Can you walk a block or two on level ground?  2.75 filed at 11/11/2024 0845 2.75  filed at 10/22/2024 0055   Can you climb a flight of stairs or walk up a hill? 5.5 filed at 11/11/2024 0845 5.5  filed at 10/22/2024 0055   Can you run a short distance? 0 filed at 11/11/2024 0845 0  filed at 10/22/2024 0055   Can you do light work around the house like dusting or washing dishes? 2.7 filed at 11/11/2024 0845 2.7  filed at 10/22/2024 0055   Can you do moderate work around the house like vacuuming, sweeping floors or carrying groceries? 3.5 filed at 11/11/2024 0845 3.5  filed at 10/22/2024 0055   Can you do heavy work around the house like scrubbing floors or lifting and moving heavy furniture?  8 filed at 11/11/2024 0845 0  filed at 10/22/2024 0055   Can you do yard work like raking leaves, weeding or pushing a mower? 4.5 filed at 11/11/2024 0845 4.5  filed at 10/22/2024 0055   Can you have sexual relations? 0 filed at 11/11/2024 0845 5.25  filed at 10/22/2024 0055   Can you participate in moderate recreational activities like golf, bowling, dancing, doubles tennis or throwing a baseball or football? 0 filed at 11/11/2024 0845 6  filed at 10/22/2024 0055   Can you participate in strenous sports like swimming, singles tennis, football, basketball, or skiing? 0 filed at 11/11/2024 0845 0  filed at 10/22/2024 0055   DASI SCORE 31.45 filed at 11/11/2024 0845 34.7  filed at 10/22/2024 0055   METS Score (Will be calculated only when all the questions are answered) 6.6 filed at 11/11/2024 0845 7  filed at 10/22/2024 0055          Caprini DVT Assessment      Flowsheet Row Pre-Admission Testing from 11/11/2024 in [facility]   DVT Score 11 filed at 11/11/2024 0906   Surgical Factors Major surgery planned, including arthroscopic and laproscopic (1-2 hours), Elective major lower extremity arthroplasty filed at  11/11/2024 0906   BMI 31-40 (Obesity) filed at 11/11/2024 0906          Modified Frailty Index    No data to display       CHADS2 Stroke Risk  Current as of 17 minutes ago        N/A 3 to 100%: High Risk   2 to < 3%: Medium Risk   0 to < 2%: Low Risk     Last Change: N/A          This score determines the patient's risk of having a stroke if the patient has atrial fibrillation.        This score is not applicable to this patient. Components are not calculated.          Revised Cardiac Risk Index      Flowsheet Row Pre-Admission Testing from 11/11/2024 in [facility]   High-Risk Surgery (Intraperitoneal, Intrathoracic,Suprainguinal vascular) 0 filed at 11/11/2024 0916   History of ischemic heart disease (History of MI, History of positive exercuse test, Current chest paint considered due to myocardial ischemia, Use of nitrate therapy, ECG with pathological Q Waves) 0 filed at 11/11/2024 0916   History of congestive heart failure (pulmonary edemia, bilateral rales or S3 gallop, Paroxysmal nocturnal dyspnea, CXR showing pulmonary vascular redistribution) 0 filed at 11/11/2024 0916   History of cerebrovascular disease (Prior TIA or stroke) 0 filed at 11/11/2024 0916   Pre-operative insulin treatment 0 filed at 11/11/2024 0916   Pre-operative creatinine>2 mg/dl 0 filed at 11/11/2024 0916   Revised Cardiac Risk Calculator 0 filed at 11/11/2024 0916          Apfel Simplified Score      Flowsheet Row Pre-Admission Testing from 11/11/2024 in [facility]   Smoking status 1 filed at 11/11/2024 0907   History of motion sickness or PONV  0 filed at 11/11/2024 0907   Use of postoperative opioids 1 filed at 11/11/2024 0907   Gender - Female 1=Yes filed at 11/11/2024 0907   Apfel Simplified Score Calculator 3 filed at 11/11/2024 0907          Risk Analysis Index Results This Encounter         11/11/2024  0846             Do you live in a place other than your own home?: 0    When did you begin living in  the place you are currently residing?: Greater than one year ago    Any kidney failure, kidney not working well, or seeing a kidney doctor (nephrologist)? If yes, was this for kidney stones or another problem?: 0 No    Any history of chronic (long-term) congestive heart failure (CHF)?: 0 No    Any shortness of breath when resting?: 0 No    In the past five years, have you been diagnosed with or treated for cancer?: No    During the last 3 months has it become difficult for you to remember things or organize your thoughts?: 0 No    Have you lost weight of 10 pounds or more in the past 3 months without trying?: 0 No    Do you have any loss of appetitie?: 0 No    Getting Around (Mobility): 0 Can get around without help    Eatin Can plan and prepare own meals    Toiletin Can use toilet without any help    Personal Hygiene (Bathing, Hand Washing, Changing Clothes): 0 Can shower or bathe without any help    BUSTOS Cancer History: Patient does not indicate history of cancer    Total Risk Analysis Index Score Without Cancer: 16    Total Risk Analysis Index Score: 16          Stop Bang Score      Flowsheet Row Pre-Admission Testing from 2024 in [facility] Questionnaire Series Submission from 10/22/2024 in [facility] with Generic Provider Agnes   Do you snore loudly? 0 filed at 202445 0  filed at 10/22/2024 0055   Do you often feel tired or fatigued after your sleep? 0 filed at 2024 0845 0  filed at 10/22/2024 0055   Has anyone ever observed you stop breathing in your sleep? 0 filed at 2024 0845 0  filed at 10/22/2024 0055   Do you have or are you being treated for high blood pressure? 0 filed at 2024 0845 0  filed at 10/22/2024 0055   Recent BMI (Calculated) 31.6 filed at 202445 31.6  filed at 10/22/2024 0055   Is BMI greater than 35 kg/m2? 0=No filed at 202445 0=No  filed at 10/22/2024 0055   Age older than 50 years old? 1=Yes filed at 2024 0845  1=Yes  filed at 10/22/2024 0055   Is your neck circumference greater than 17 inches (Male) or 16 inches (Female)? 0 filed at 11/11/2024 0845 --   Gender - Male 0=No filed at 11/11/2024 0845 0=No  filed at 10/22/2024 0055   STOP-BANG Total Score 1 filed at 11/11/2024 0845 --          Prodigy: High Risk  Total Score: 0          ARISCAT Score for Postoperative Pulmonary Complications    No data to display       Kye Perioperative Risk for Myocardial Infarction or Cardiac Arrest (DIONNE)    No data to display             Assessment and Plan:     HPI 60 y/o male scheduled for total knee arthroplasty on 11/25/2024 with  Dr. Amezcua secondary to chronic pain of left knee.  PMHX includes chronic pain of left knee.  PAT is consulted today for perioperative risk stratification and optimization.      Neuro:  No neurologic diagnosis or significant findings on chart review, clinical presentation and evaluation.  No grossly apparent neurologic perioperative risk.    Patient is at increased risk for perioperative CVA secondary to previous CVA/TIA    HEENT:  No HEENT diagnosis or significant findings on chart review or clinical presentation and evaluation. No further preoperative testing/intervention indicated at this time.    Cardiovascular:  No CV diagnosis or significant findings on chart review or clinical presentation and evaluation. No further preoperative testing or intervention is indicated at this time.  METS: 7  RCRI: 0 points, 3.9%  risk for postoperative MACE   EKG - as above    Pulmonary:  No pulmonary diagnosis, however patient is at increased risk of perioperative complications secondary to obesity  Stop Bang score is 1 placing patient at low risk for JUSTICE  PRODIGY: Low risk for opioid induced respiratory depression  Pumonary education discussed, patient also provided deep breathing exerciseswith educational handout    Renal:   No renal diagnosis, however patient is at increase risk for perioperative renal complications  secondary to  Age equal to or greater than 56, BMI equal to or greater than 30, use of an ace, arb, or NSAID      Endocrine:  No endocrine diagnosis or significant findings on chart review or clinical presentation and evaluation. No further testing or intervention is indicated at this time.    Hematologic:  No hematologic diagnosis, however patient is at an increased risk for DVT  Caprini Score 11, patient at High risk for perioperative DVT.  Patient provided with VTE education/handout.    Gastrointestinal:   No GI diagnosis or significant findings on chart review or clinical presentation and evaluation.   Apfel 3    Orthopaedic Surgery  Follows with Dr. Amezcua.  Last seen 8/28/2024 for left knee pain  A1c pending  Attended Joint class  Plan for total left knee     Infectious disease:   No infectious diagnosis or significant findings on chart review or clinical presentation and evaluation.   Prescription provided for CHG body wash and dental rinse. CHG use instructions reviewed and provided to patient.  Staph screen collected    Musculoskeletal:   No diagnosis or significant findings on chart review or clinical presentation and evaluation.     Anesthesia/Airway:  No anesthesia complications      Medication instructions and NPO guidelines reviewed with the patient.  All questions or concerns discussed and addressed.      Labs and EKG ordered

## 2024-11-08 NOTE — H&P (VIEW-ONLY)
CPM/PAT Evaluation       Name: Elizabeth Tran (Elizabeth Tran)  /Age: 1965/59 y.o.     Visit Type:   In-Person       Chief Complaint: Chronic pain of left knee    HPI 58 y/o male scheduled for total knee arthroplasty on 2024 with  Dr. Amezcua secondary to chronic pain of left knee.  PMHX includes chronic pain of left knee.  PAT is consulted today for perioperative risk stratification and optimization.      Past Medical History:   Diagnosis Date    Arthritis     Personal history of transient ischemic attack (TIA), and cerebral infarction without residual deficits     History of stroke    Stroke (Multi)        Past Surgical History:   Procedure Laterality Date     SECTION, CLASSIC       Section x3    ORIF RADIUS & ULNA FRACTURES Left        Patient  has no history on file for sexual activity.    Family History   Problem Relation Name Age of Onset    Other (Lymphoma leukemia) Mother         Allergies   Allergen Reactions    Aspirin Hives and GI Upset    Morphine Itching       Prior to Admission medications    Medication Sig Start Date End Date Taking? Authorizing Provider   benzocaine (Orajel) 10 % mucosal gel Use in the mouth or throat.    Historical Provider, MD   ibuprofen 800 mg tablet Take 1 tablet (800 mg) by mouth 3 times a day.    Historical Provider, MD   loratadine (Claritin) 10 mg tablet Loratadine 10 MG Oral Tablet   Quantity: 30  Refills: 0        Start : 2022   Active 22   Historical Provider, MD   Mobic 15 mg tablet Take 1 tablet (15 mg) by mouth once daily. 24  Abner Amezcua MD   omeprazole (PriLOSEC) 20 mg DR capsule Omeprazole 20 MG Oral Capsule Delayed Release   Quantity: 60  Refills: 0        Start : 2022   Active 22   Historical Provider, MD   pantoprazole (ProtoNix) 40 mg EC tablet Take 1 tablet (40 mg) by mouth once daily. 11/10/15   Historical Provider, MD   sodium hyaluronate (Euflexxa) 10 mg/mL(mw 2.4 -3.6 million)  injection Euflexxa 20 MG/2ML Intra-articular Solution Prefilled Syringe   Quantity: 12  Refills: 0        Start : 13-Jul-2021   Active 7/13/21   Historical Provider, MD ALMAGUER ROS:   Constitutional:   neg    Neuro/Psych:   neg    Eyes:    use of corrective lenses  Ears:   neg    Nose:   Mouth:   Throat:   neg    Neck:   neg    Cardio:   neg    Respiratory:   neg    Endocrine:   GI:   neg    :   neg    Musculoskeletal:   neg    Hematologic:   neg    Skin:      Physical Exam  Vitals reviewed.   Constitutional:       Appearance: Normal appearance.   HENT:      Head: Normocephalic and atraumatic.      Mouth/Throat:      Mouth: Mucous membranes are moist.      Pharynx: Oropharynx is clear.   Eyes:      Extraocular Movements: Extraocular movements intact.      Pupils: Pupils are equal, round, and reactive to light.   Cardiovascular:      Rate and Rhythm: Normal rate and regular rhythm.   Pulmonary:      Effort: Pulmonary effort is normal.      Breath sounds: Normal breath sounds.   Abdominal:      General: Abdomen is flat.      Palpations: Abdomen is soft.   Musculoskeletal:         General: Normal range of motion.      Cervical back: Normal range of motion and neck supple.   Skin:     General: Skin is warm and dry.   Neurological:      General: No focal deficit present.      Mental Status: She is alert and oriented to person, place, and time.   Psychiatric:         Mood and Affect: Mood normal.         Behavior: Behavior normal.          Airway    Testing/Diagnostic:     Patient Specialist/PCP:     Visit Vitals  /77   Pulse 64   Temp 36.3 °C (97.3 °F) (Tympanic)   Resp 16       DASI Risk Score      Flowsheet Row Pre-Admission Testing from 11/11/2024 in [facility] Questionnaire Series Submission from 10/22/2024 in [facility] with Generic Provider Agnes   Can you take care of yourself (eat, dress, bathe, or use toilet)?  2.75 filed at 11/11/2024 0845 2.75  filed at 10/22/2024 0055   Can you  walk indoors, such as around your house? 1.75 filed at 11/11/2024 0845 1.75  filed at 10/22/2024 0055   Can you walk a block or two on level ground?  2.75 filed at 11/11/2024 0845 2.75  filed at 10/22/2024 0055   Can you climb a flight of stairs or walk up a hill? 5.5 filed at 11/11/2024 0845 5.5  filed at 10/22/2024 0055   Can you run a short distance? 0 filed at 11/11/2024 0845 0  filed at 10/22/2024 0055   Can you do light work around the house like dusting or washing dishes? 2.7 filed at 11/11/2024 0845 2.7  filed at 10/22/2024 0055   Can you do moderate work around the house like vacuuming, sweeping floors or carrying groceries? 3.5 filed at 11/11/2024 0845 3.5  filed at 10/22/2024 0055   Can you do heavy work around the house like scrubbing floors or lifting and moving heavy furniture?  8 filed at 11/11/2024 0845 0  filed at 10/22/2024 0055   Can you do yard work like raking leaves, weeding or pushing a mower? 4.5 filed at 11/11/2024 0845 4.5  filed at 10/22/2024 0055   Can you have sexual relations? 0 filed at 11/11/2024 0845 5.25  filed at 10/22/2024 0055   Can you participate in moderate recreational activities like golf, bowling, dancing, doubles tennis or throwing a baseball or football? 0 filed at 11/11/2024 0845 6  filed at 10/22/2024 0055   Can you participate in strenous sports like swimming, singles tennis, football, basketball, or skiing? 0 filed at 11/11/2024 0845 0  filed at 10/22/2024 0055   DASI SCORE 31.45 filed at 11/11/2024 0845 34.7  filed at 10/22/2024 0055   METS Score (Will be calculated only when all the questions are answered) 6.6 filed at 11/11/2024 0845 7  filed at 10/22/2024 0055          Caprini DVT Assessment      Flowsheet Row Pre-Admission Testing from 11/11/2024 in [facility]   DVT Score 11 filed at 11/11/2024 0906   Surgical Factors Major surgery planned, including arthroscopic and laproscopic (1-2 hours), Elective major lower extremity arthroplasty filed at  11/11/2024 0906   BMI 31-40 (Obesity) filed at 11/11/2024 0906          Modified Frailty Index    No data to display       CHADS2 Stroke Risk  Current as of 17 minutes ago        N/A 3 to 100%: High Risk   2 to < 3%: Medium Risk   0 to < 2%: Low Risk     Last Change: N/A          This score determines the patient's risk of having a stroke if the patient has atrial fibrillation.        This score is not applicable to this patient. Components are not calculated.          Revised Cardiac Risk Index      Flowsheet Row Pre-Admission Testing from 11/11/2024 in [facility]   High-Risk Surgery (Intraperitoneal, Intrathoracic,Suprainguinal vascular) 0 filed at 11/11/2024 0916   History of ischemic heart disease (History of MI, History of positive exercuse test, Current chest paint considered due to myocardial ischemia, Use of nitrate therapy, ECG with pathological Q Waves) 0 filed at 11/11/2024 0916   History of congestive heart failure (pulmonary edemia, bilateral rales or S3 gallop, Paroxysmal nocturnal dyspnea, CXR showing pulmonary vascular redistribution) 0 filed at 11/11/2024 0916   History of cerebrovascular disease (Prior TIA or stroke) 0 filed at 11/11/2024 0916   Pre-operative insulin treatment 0 filed at 11/11/2024 0916   Pre-operative creatinine>2 mg/dl 0 filed at 11/11/2024 0916   Revised Cardiac Risk Calculator 0 filed at 11/11/2024 0916          Apfel Simplified Score      Flowsheet Row Pre-Admission Testing from 11/11/2024 in [facility]   Smoking status 1 filed at 11/11/2024 0907   History of motion sickness or PONV  0 filed at 11/11/2024 0907   Use of postoperative opioids 1 filed at 11/11/2024 0907   Gender - Female 1=Yes filed at 11/11/2024 0907   Apfel Simplified Score Calculator 3 filed at 11/11/2024 0907          Risk Analysis Index Results This Encounter         11/11/2024  0846             Do you live in a place other than your own home?: 0    When did you begin living in  the place you are currently residing?: Greater than one year ago    Any kidney failure, kidney not working well, or seeing a kidney doctor (nephrologist)? If yes, was this for kidney stones or another problem?: 0 No    Any history of chronic (long-term) congestive heart failure (CHF)?: 0 No    Any shortness of breath when resting?: 0 No    In the past five years, have you been diagnosed with or treated for cancer?: No    During the last 3 months has it become difficult for you to remember things or organize your thoughts?: 0 No    Have you lost weight of 10 pounds or more in the past 3 months without trying?: 0 No    Do you have any loss of appetitie?: 0 No    Getting Around (Mobility): 0 Can get around without help    Eatin Can plan and prepare own meals    Toiletin Can use toilet without any help    Personal Hygiene (Bathing, Hand Washing, Changing Clothes): 0 Can shower or bathe without any help    BUSTOS Cancer History: Patient does not indicate history of cancer    Total Risk Analysis Index Score Without Cancer: 16    Total Risk Analysis Index Score: 16          Stop Bang Score      Flowsheet Row Pre-Admission Testing from 2024 in [facility] Questionnaire Series Submission from 10/22/2024 in [facility] with Generic Provider Agnes   Do you snore loudly? 0 filed at 202445 0  filed at 10/22/2024 0055   Do you often feel tired or fatigued after your sleep? 0 filed at 2024 0845 0  filed at 10/22/2024 0055   Has anyone ever observed you stop breathing in your sleep? 0 filed at 2024 0845 0  filed at 10/22/2024 0055   Do you have or are you being treated for high blood pressure? 0 filed at 2024 0845 0  filed at 10/22/2024 0055   Recent BMI (Calculated) 31.6 filed at 202445 31.6  filed at 10/22/2024 0055   Is BMI greater than 35 kg/m2? 0=No filed at 202445 0=No  filed at 10/22/2024 0055   Age older than 50 years old? 1=Yes filed at 2024 0845  1=Yes  filed at 10/22/2024 0055   Is your neck circumference greater than 17 inches (Male) or 16 inches (Female)? 0 filed at 11/11/2024 0845 --   Gender - Male 0=No filed at 11/11/2024 0845 0=No  filed at 10/22/2024 0055   STOP-BANG Total Score 1 filed at 11/11/2024 0845 --          Prodigy: High Risk  Total Score: 0          ARISCAT Score for Postoperative Pulmonary Complications    No data to display       Kye Perioperative Risk for Myocardial Infarction or Cardiac Arrest (DIONNE)    No data to display             Assessment and Plan:     HPI 60 y/o male scheduled for total knee arthroplasty on 11/25/2024 with  Dr. Amezcua secondary to chronic pain of left knee.  PMHX includes chronic pain of left knee.  PAT is consulted today for perioperative risk stratification and optimization.      Neuro:  No neurologic diagnosis or significant findings on chart review, clinical presentation and evaluation.  No grossly apparent neurologic perioperative risk.    Patient is at increased risk for perioperative CVA secondary to previous CVA/TIA    HEENT:  No HEENT diagnosis or significant findings on chart review or clinical presentation and evaluation. No further preoperative testing/intervention indicated at this time.    Cardiovascular:  No CV diagnosis or significant findings on chart review or clinical presentation and evaluation. No further preoperative testing or intervention is indicated at this time.  METS: 7  RCRI: 0 points, 3.9%  risk for postoperative MACE   EKG - as above    Pulmonary:  No pulmonary diagnosis, however patient is at increased risk of perioperative complications secondary to obesity  Stop Bang score is 1 placing patient at low risk for JUSTICE  PRODIGY: Low risk for opioid induced respiratory depression  Pumonary education discussed, patient also provided deep breathing exerciseswith educational handout    Renal:   No renal diagnosis, however patient is at increase risk for perioperative renal complications  secondary to  Age equal to or greater than 56, BMI equal to or greater than 30, use of an ace, arb, or NSAID      Endocrine:  No endocrine diagnosis or significant findings on chart review or clinical presentation and evaluation. No further testing or intervention is indicated at this time.    Hematologic:  No hematologic diagnosis, however patient is at an increased risk for DVT  Caprini Score 11, patient at High risk for perioperative DVT.  Patient provided with VTE education/handout.    Gastrointestinal:   No GI diagnosis or significant findings on chart review or clinical presentation and evaluation.   Apfel 3    Orthopaedic Surgery  Follows with Dr. Amezcua.  Last seen 8/28/2024 for left knee pain  A1c pending  Attended Joint class  Plan for total left knee     Infectious disease:   No infectious diagnosis or significant findings on chart review or clinical presentation and evaluation.   Prescription provided for CHG body wash and dental rinse. CHG use instructions reviewed and provided to patient.  Staph screen collected    Musculoskeletal:   No diagnosis or significant findings on chart review or clinical presentation and evaluation.     Anesthesia/Airway:  No anesthesia complications      Medication instructions and NPO guidelines reviewed with the patient.  All questions or concerns discussed and addressed.      Labs and EKG ordered

## 2024-11-11 ENCOUNTER — PRE-ADMISSION TESTING (OUTPATIENT)
Dept: PREADMISSION TESTING | Facility: HOSPITAL | Age: 59
End: 2024-11-11
Payer: COMMERCIAL

## 2024-11-11 ENCOUNTER — HOSPITAL ENCOUNTER (OUTPATIENT)
Dept: RADIOLOGY | Facility: HOSPITAL | Age: 59
Discharge: HOME | End: 2024-11-11
Payer: COMMERCIAL

## 2024-11-11 VITALS
HEIGHT: 65 IN | RESPIRATION RATE: 16 BRPM | DIASTOLIC BLOOD PRESSURE: 77 MMHG | SYSTOLIC BLOOD PRESSURE: 148 MMHG | TEMPERATURE: 97.3 F | BODY MASS INDEX: 34.89 KG/M2 | HEART RATE: 64 BPM | WEIGHT: 209.44 LBS | OXYGEN SATURATION: 97 %

## 2024-11-11 DIAGNOSIS — Z01.818 PREOPERATIVE EXAMINATION: Primary | ICD-10-CM

## 2024-11-11 DIAGNOSIS — R73.03 PRE-DIABETES: ICD-10-CM

## 2024-11-11 DIAGNOSIS — G89.29 OTHER CHRONIC PAIN: ICD-10-CM

## 2024-11-11 DIAGNOSIS — M25.562 PAIN IN LEFT KNEE: ICD-10-CM

## 2024-11-11 LAB
ALBUMIN SERPL BCP-MCNC: 4 G/DL (ref 3.4–5)
ALP SERPL-CCNC: 19 U/L (ref 33–110)
ALT SERPL W P-5'-P-CCNC: 14 U/L (ref 7–45)
ANION GAP SERPL CALC-SCNC: 13 MMOL/L (ref 10–20)
AST SERPL W P-5'-P-CCNC: 13 U/L (ref 9–39)
ATRIAL RATE: 63 BPM
BASOPHILS # BLD AUTO: 0.08 X10*3/UL (ref 0–0.1)
BASOPHILS NFR BLD AUTO: 0.9 %
BILIRUB SERPL-MCNC: 0.5 MG/DL (ref 0–1.2)
BUN SERPL-MCNC: 16 MG/DL (ref 6–23)
CALCIUM SERPL-MCNC: 9.3 MG/DL (ref 8.6–10.3)
CHLORIDE SERPL-SCNC: 105 MMOL/L (ref 98–107)
CO2 SERPL-SCNC: 26 MMOL/L (ref 21–32)
CREAT SERPL-MCNC: 0.7 MG/DL (ref 0.5–1.05)
EGFRCR SERPLBLD CKD-EPI 2021: >90 ML/MIN/1.73M*2
EOSINOPHIL # BLD AUTO: 0.5 X10*3/UL (ref 0–0.7)
EOSINOPHIL NFR BLD AUTO: 5.7 %
ERYTHROCYTE [DISTWIDTH] IN BLOOD BY AUTOMATED COUNT: 13.6 % (ref 11.5–14.5)
EST. AVERAGE GLUCOSE BLD GHB EST-MCNC: 108 MG/DL
GLUCOSE SERPL-MCNC: 87 MG/DL (ref 74–99)
HBA1C MFR BLD: 5.4 %
HCT VFR BLD AUTO: 34.8 % (ref 36–46)
HGB BLD-MCNC: 10.9 G/DL (ref 12–16)
IMM GRANULOCYTES # BLD AUTO: 0.03 X10*3/UL (ref 0–0.7)
IMM GRANULOCYTES NFR BLD AUTO: 0.3 % (ref 0–0.9)
LYMPHOCYTES # BLD AUTO: 1.9 X10*3/UL (ref 1.2–4.8)
LYMPHOCYTES NFR BLD AUTO: 21.8 %
MCH RBC QN AUTO: 28 PG (ref 26–34)
MCHC RBC AUTO-ENTMCNC: 31.3 G/DL (ref 32–36)
MCV RBC AUTO: 90 FL (ref 80–100)
MONOCYTES # BLD AUTO: 0.58 X10*3/UL (ref 0.1–1)
MONOCYTES NFR BLD AUTO: 6.7 %
NEUTROPHILS # BLD AUTO: 5.63 X10*3/UL (ref 1.2–7.7)
NEUTROPHILS NFR BLD AUTO: 64.6 %
NRBC BLD-RTO: 0 /100 WBCS (ref 0–0)
P AXIS: 10 DEGREES
P OFFSET: 198 MS
P ONSET: 156 MS
PLATELET # BLD AUTO: 303 X10*3/UL (ref 150–450)
POTASSIUM SERPL-SCNC: 4.4 MMOL/L (ref 3.5–5.3)
PR INTERVAL: 130 MS
PROT SERPL-MCNC: 6.2 G/DL (ref 6.4–8.2)
Q ONSET: 221 MS
QRS COUNT: 11 BEATS
QRS DURATION: 92 MS
QT INTERVAL: 420 MS
QTC CALCULATION(BAZETT): 429 MS
QTC FREDERICIA: 427 MS
R AXIS: 19 DEGREES
RBC # BLD AUTO: 3.89 X10*6/UL (ref 4–5.2)
SODIUM SERPL-SCNC: 140 MMOL/L (ref 136–145)
T AXIS: 25 DEGREES
T OFFSET: 431 MS
VENTRICULAR RATE: 63 BPM
WBC # BLD AUTO: 8.7 X10*3/UL (ref 4.4–11.3)

## 2024-11-11 PROCEDURE — 36415 COLL VENOUS BLD VENIPUNCTURE: CPT

## 2024-11-11 PROCEDURE — 83036 HEMOGLOBIN GLYCOSYLATED A1C: CPT | Mod: GEALAB

## 2024-11-11 PROCEDURE — 93005 ELECTROCARDIOGRAM TRACING: CPT

## 2024-11-11 PROCEDURE — 85025 COMPLETE CBC W/AUTO DIFF WBC: CPT

## 2024-11-11 PROCEDURE — 99244 OFF/OP CNSLTJ NEW/EST MOD 40: CPT | Performed by: NURSE PRACTITIONER

## 2024-11-11 PROCEDURE — 80053 COMPREHEN METABOLIC PANEL: CPT

## 2024-11-11 PROCEDURE — 77073 BONE LENGTH STUDIES: CPT

## 2024-11-11 PROCEDURE — 77073 BONE LENGTH STUDIES: CPT | Performed by: RADIOLOGY

## 2024-11-11 PROCEDURE — 87081 CULTURE SCREEN ONLY: CPT | Mod: GEALAB

## 2024-11-11 RX ORDER — CHLORHEXIDINE GLUCONATE 40 MG/ML
1 SOLUTION TOPICAL DAILY
Start: 2024-11-11 | End: 2024-11-16

## 2024-11-11 RX ORDER — CHLORHEXIDINE GLUCONATE ORAL RINSE 1.2 MG/ML
15 SOLUTION DENTAL DAILY
Qty: 30 ML | Refills: 0 | Status: SHIPPED | OUTPATIENT
Start: 2024-11-11 | End: 2024-11-13

## 2024-11-11 ASSESSMENT — DUKE ACTIVITY SCORE INDEX (DASI)
CAN YOU WALK A BLOCK OR TWO ON LEVEL GROUND: YES
TOTAL_SCORE: 31.45
CAN YOU RUN A SHORT DISTANCE: NO
CAN YOU DO YARD WORK LIKE RAKING LEAVES, WEEDING OR PUSHING A MOWER: YES
CAN YOU WALK INDOORS, SUCH AS AROUND YOUR HOUSE: YES
CAN YOU HAVE SEXUAL RELATIONS: NO
CAN YOU PARTICIPATE IN MODERATE RECREATIONAL ACTIVITIES LIKE GOLF, BOWLING, DANCING, DOUBLES TENNIS OR THROWING A BASEBALL OR FOOTBALL: NO
CAN YOU TAKE CARE OF YOURSELF (EAT, DRESS, BATHE, OR USE TOILET): YES
CAN YOU CLIMB A FLIGHT OF STAIRS OR WALK UP A HILL: YES
CAN YOU DO LIGHT WORK AROUND THE HOUSE LIKE DUSTING OR WASHING DISHES: YES
CAN YOU DO MODERATE WORK AROUND THE HOUSE LIKE VACUUMING, SWEEPING FLOORS OR CARRYING GROCERIES: YES
CAN YOU PARTICIPATE IN STRENOUS SPORTS LIKE SWIMMING, SINGLES TENNIS, FOOTBALL, BASKETBALL, OR SKIING: NO
DASI METS SCORE: 6.6
CAN YOU DO HEAVY WORK AROUND THE HOUSE LIKE SCRUBBING FLOORS OR LIFTING AND MOVING HEAVY FURNITURE: YES

## 2024-11-11 ASSESSMENT — PAIN - FUNCTIONAL ASSESSMENT: PAIN_FUNCTIONAL_ASSESSMENT: 0-10

## 2024-11-11 ASSESSMENT — PAIN SCALES - GENERAL: PAINLEVEL_OUTOF10: 3

## 2024-11-11 ASSESSMENT — ENCOUNTER SYMPTOMS
NEUROLOGICAL NEGATIVE: 1
RESPIRATORY NEGATIVE: 1
CONSTITUTIONAL NEGATIVE: 1
CARDIOVASCULAR NEGATIVE: 1
NECK NEGATIVE: 1
MUSCULOSKELETAL NEGATIVE: 1
GASTROINTESTINAL NEGATIVE: 1

## 2024-11-11 ASSESSMENT — LIFESTYLE VARIABLES: SMOKING_STATUS: NONSMOKER

## 2024-11-11 ASSESSMENT — ACTIVITIES OF DAILY LIVING (ADL): ADL_SCORE: 0

## 2024-11-12 LAB — STAPHYLOCOCCUS SPEC CULT: NORMAL

## 2024-11-18 LAB
ATRIAL RATE: 63 BPM
P AXIS: 10 DEGREES
P OFFSET: 198 MS
P ONSET: 156 MS
PR INTERVAL: 130 MS
Q ONSET: 221 MS
QRS COUNT: 11 BEATS
QRS DURATION: 92 MS
QT INTERVAL: 420 MS
QTC CALCULATION(BAZETT): 429 MS
QTC FREDERICIA: 427 MS
R AXIS: 19 DEGREES
T AXIS: 25 DEGREES
T OFFSET: 431 MS
VENTRICULAR RATE: 63 BPM

## 2024-11-21 ENCOUNTER — ANESTHESIA EVENT (OUTPATIENT)
Dept: OPERATING ROOM | Facility: HOSPITAL | Age: 59
End: 2024-11-21
Payer: COMMERCIAL

## 2024-11-21 PROCEDURE — RXMED WILLOW AMBULATORY MEDICATION CHARGE

## 2024-11-21 RX ORDER — DOCUSATE SODIUM 100 MG/1
100 CAPSULE, LIQUID FILLED ORAL 2 TIMES DAILY
Qty: 20 CAPSULE | Refills: 0 | Status: SHIPPED | OUTPATIENT
Start: 2024-11-21 | End: 2024-12-05

## 2024-11-21 RX ORDER — OXYCODONE AND ACETAMINOPHEN 5; 325 MG/1; MG/1
1 TABLET ORAL EVERY 4 HOURS PRN
Qty: 36 TABLET | Refills: 0 | Status: SHIPPED | OUTPATIENT
Start: 2024-11-21 | End: 2024-12-01

## 2024-11-21 RX ORDER — POLYETHYLENE GLYCOL 3350 17 G/17G
17 POWDER, FOR SOLUTION ORAL DAILY
Qty: 238 G | Refills: 0 | Status: SHIPPED | OUTPATIENT
Start: 2024-11-21

## 2024-11-21 RX ORDER — CEFAZOLIN SODIUM 2 G/50ML
2 SOLUTION INTRAVENOUS ONCE
Status: CANCELLED | OUTPATIENT
Start: 2024-11-21 | End: 2024-11-21

## 2024-11-21 NOTE — DISCHARGE INSTRUCTIONS
TOTAL HIP AND KNEE REPLACEMENT DISCHARGE INSTRUCTIONS  Abner Amezcua MD      DRIVING & TRAVEL AFTER SURGERY   Patients should anticipate waiting at least 4-6 weeks before traveling long distances after surgery.  You will need to stop to walk around ever 1 hour during your travel to help with blood clot prevention.  Please call the office or your joint nurse to discuss prior to post-surgical travel.  Patients may not drive until cleared by the joint nurse or the office.    DENTAL PROCEDURES & CLEANINGS  You must wait a minimum of 3 months for elective dental appointments, including routine cleanings or dental work including bridges, crowns, extractions, etc..  For any dental visit - cleaning or dental procedures - patients must take an antibiotic 1 hour before the appointment.  Antibiotics are a lifelong need before dental appointments.  The antibiotic prescribed will be based on each patient's allergies.    WOUND CARE  If you experience continued drainage or bleeding, you may cover with abdominal pads (purchase at local drug store).  Knee replacements should wrap with an ace wrap.  Do not remove surgical dressing, it will be removed when you arrive in clinic for follow up visit.   Mesh dressing under the bandage will remain in place until it peels off on its own.  If it is loose, you may gently remove.  Do not remove if pulling causes resistance against the incision.      PAIN, SWELLING, BRUISING & CLICKING  Pain and swelling are a natural part of your recovery which is considered normal fur up to a year after surgery.  Symptoms may be treated with movement, ice, compression stockings, elevating your leg, and by following the pain medication regimen as prescribed.  Bruising is normal for several weeks after surgery and will run down the leg over time.  You may ice areas that are tender to help with discomfort.  You are required to wear the provided compression stockings, every day, for 4 weeks following surgery.   Remove the stockings at night and place them back on in the morning.  Pain and swelling may temporarily increase with an increase in activity or exercise.  Use ice after activity.  Audible clicking with movement or exercises is considered normal following joint replacement.  You may also feel decreased sensation or numbness near the incision site.  These are usually normal and may or may not fade over time.     PERSONAL HYGIENE  You may shower upon discharging from the hospital.  Soap and water is permitted to run over the surgical dressing, steri-strips and incision.  Do not scrub directly over these items.  DO NOT soak your incision in a bath, hot tub, pool or pond/lake for a minimum of 8 weeks following your surgery.  DO NOT use lotions, creams, ointments on your wound for a minimum of 6 weeks following your surgery. At that time you may use vitamin E to assist with softening of your incision.      RESTARTING HOME ROUTINE - DIET & MEDICATIONS  Post-operative constipation can result due to a combination of inactivity, anesthesia and pain medication. To help prevent this, you should increase your water and fiber intake. Physical activity such as walking will also help stimulate the bowels.   You may resume your normal diet when you discharge home.  Choose foods that help promote good bowel habits and prevent constipation, such as foods high in fiber.  You may restart your home medications the following day after your surgery UNLESS you have been given alternate instructions.  Follow the instructions given to you on your hospital discharge instructions for more information regarding your home medications.      IN-HOME PHYSICAL THERAPY & OUTPATIENT PHYSICAL THERAPY  In-home physical therapy will start 1-2 days after you get home from the hospital.    The home care agency will call within the first 24-48 hours to set up their first visit.  Please do not call your care team to inquire during this timeframe.  Continue  the exercises you were given in the hospital until you have been seen by in-home therapy.  Make sure to provide a phone number with the ability for the home care staff to leave a message if you do not answer your phone.    Outpatient physical therapy following knee replacement surgery should begin 2-3 weeks after surgery.  You should call to schedule this appointment ASAP if not already scheduled before surgery.  Waiting until you are ready for outpatient physical therapy will cause a delay in your care.  You may choose any outpatient physical therapy location.  Call the office for an order if needed.    EMERGENCIES - WHEN TO CONTACT THE SURGEON'S OFFICE IMMEDIATELY  Fever >101 with chills that has been present for at least 48 hours.   Excessive bleeding from incision that will not slow down. A small amount of drainage is normal and expected.  Once pressure is applied and the area is covered, do not continue to check the area regularly.  This will remove pressure and bleeding will continue.  Leave in place for 4-6 hours.  Signs of infection of incision-excessive drainage that is soaking through your dressing (especially if it is pus-like), redness that is spreading out from the edges of your incision, or increased warmth around the area.  Excruciating pain for which the pain medication, taken as instructed, is not helping.  Severe calf pain.  Go directly to the emergency room or call 911, if you are experiencing chest pain or difficulty breathing.    ICE & COLD THERAPY INSTRUCTIONS    To assist with pain control and post-op swelling, you should be using ice regularly throughout recovery, especially for the first 6 weeks, regardless of the cold therapy method you use.      Always make sure there is a layer of protection between the cold pad and your skin.    If you are using ICE PACKS or GEL PACKS, you will need to alternate 20 minutes on, 20 minutes off twice per hour.    If you are using an ICE MACHINE, please  follow the provided ice machine instructions.  These devices differ from ice or ice packs whereas the mechanism circulates water through tubing and a pad to provide longer periods of cold therapy to the desired site.  You can use your cold devices around the clock for optimal comfort.  We recommend using cold therapy after working with therapy or completing exercises on your own.  There is no set schedule in which you must follow while using cold therapy.  Below are a few points to remember when using a cold therapy device:    You do not need to need to use the 20 on, 20 off method.  Detach the pad from the cooler and ambulate at least once every hour.  You can check your skin under the pad at this time.  You may wear the cold therapy device during periods of sleep including overnight.  If you wake up during the night, you can check the skin at this time.  You do not need to wake up specifically to perform skin checks.  Empty the cooler and pad when device is not in use.  Follow 's instructions for cleaning your cold therapy device.      DISCHARGE MEDICATIONS    PAIN MEDICATION    __X__ Oxycodone-acetaminophen  Oxycodone-acetaminophen has been prescribed for post-operative pain control.    These medications may only be refilled if neededONCE every 7 days for a period of up to 6 weeks following surgery.  After 6 weeks, you will transition to acetaminophen and over -the- counter anti-inflammatories such as Ibuprofen, Advil or Aleve in conjunction with ICE/COLD THERAPY.   Side effects may be constipation and nausea, vomiting, sleepiness, dizziness, lightheadedness, headache, blurred vision, dry mouth sweating, itching (if you have itching, over-the -counter Benadryl can be used as needed).  You may NOT operate a motor vehicle while taking these medications or have been cleared by your care team.    Please call the office for a refill request.  The office staff and orthopedic nurses cannot refill  medications; messages should be left directly through the office.  Please do not call multiple times or call other members of the care team for medication needs, this will cause the refill to take longer.    Per State and Institutional policy, pain medications can only be refilled every 7 days for up to six weeks following surgery.   .    ___X____ Naproxen has been prescribed as an adjunct anti-inflammatory to assist in pain control.    Take one 500 mg tablet twice a day for 4 weeks.  You will not receive refills on this medication.   Side effects may include nausea.  May not be prescribed if you are on a more potent blood thinner than aspirin or have chronic kidney disease.    BLOOD THINNER    __X__ Blood Thinner   A blood thinner has been prescribed to prevent blood clots in your leg or lungs. Take as prescribed on the bottle for 4 weeks. You will not receive a refill on this medication.      STOOL SOFTENERS    __X__ Colace (Docusate Sodium)   Take this medication to help with constipation while using the Oxycodone for pain control.  You will not receive a refill on this medication.  If you have not had a bowel movement for 2 days after surgery if you feel painful constipation, you may try over-the-counter Miralax or a suppository. If you still are not able to have a bowel movement, call our office or discuss with your PCP. If you have preexisting constipation or other GI issues that may predispose you to constipation, please consult with your GI provider or PCP if you have not had a bowel movement for 2 days after surgery.     Antibiotics    ____ Cefadroxil or doxycycline  May be prescribed if needed for prophylaxis against infection   Take 7 day course of antibiotics until they are all gone  May not be prescribed if you do not meet criteria for elevated infection risk after surgery          You will not receive refills on the following medications:    Naproxen  Colace  Blood Thinner

## 2024-11-22 ENCOUNTER — HOME HEALTH ADMISSION (OUTPATIENT)
Dept: HOME HEALTH SERVICES | Facility: HOME HEALTH | Age: 59
End: 2024-11-22
Payer: COMMERCIAL

## 2024-11-22 ENCOUNTER — TELEPHONE (OUTPATIENT)
Dept: INPATIENT UNIT | Facility: HOSPITAL | Age: 59
End: 2024-11-22
Payer: COMMERCIAL

## 2024-11-22 DIAGNOSIS — M25.562 ACUTE PAIN OF LEFT KNEE: ICD-10-CM

## 2024-11-22 RX ORDER — MELOXICAM 15 MG/1
15 TABLET ORAL DAILY
Qty: 60 TABLET | Refills: 0 | Status: SHIPPED | OUTPATIENT
Start: 2024-11-22

## 2024-11-25 ENCOUNTER — PHARMACY VISIT (OUTPATIENT)
Dept: PHARMACY | Facility: CLINIC | Age: 59
End: 2024-11-25
Payer: COMMERCIAL

## 2024-11-25 ENCOUNTER — HOSPITAL ENCOUNTER (OUTPATIENT)
Facility: HOSPITAL | Age: 59
Setting detail: OUTPATIENT SURGERY
Discharge: HOME HEALTH | End: 2024-11-25
Attending: ORTHOPAEDIC SURGERY | Admitting: ORTHOPAEDIC SURGERY
Payer: COMMERCIAL

## 2024-11-25 ENCOUNTER — APPOINTMENT (OUTPATIENT)
Dept: RADIOLOGY | Facility: HOSPITAL | Age: 59
End: 2024-11-25
Payer: COMMERCIAL

## 2024-11-25 ENCOUNTER — DOCUMENTATION (OUTPATIENT)
Dept: HOME HEALTH SERVICES | Facility: HOME HEALTH | Age: 59
End: 2024-11-25
Payer: COMMERCIAL

## 2024-11-25 ENCOUNTER — ANESTHESIA (OUTPATIENT)
Dept: OPERATING ROOM | Facility: HOSPITAL | Age: 59
End: 2024-11-25
Payer: COMMERCIAL

## 2024-11-25 VITALS
HEART RATE: 86 BPM | WEIGHT: 200.62 LBS | DIASTOLIC BLOOD PRESSURE: 94 MMHG | TEMPERATURE: 97.3 F | OXYGEN SATURATION: 96 % | HEIGHT: 65 IN | SYSTOLIC BLOOD PRESSURE: 138 MMHG | BODY MASS INDEX: 33.43 KG/M2 | RESPIRATION RATE: 20 BRPM

## 2024-11-25 DIAGNOSIS — M25.562 ACUTE PAIN OF LEFT KNEE: ICD-10-CM

## 2024-11-25 DIAGNOSIS — G89.29 CHRONIC PAIN OF LEFT KNEE: ICD-10-CM

## 2024-11-25 DIAGNOSIS — M17.12 ARTHRITIS OF LEFT KNEE: Primary | ICD-10-CM

## 2024-11-25 DIAGNOSIS — Z96.652 S/P TKR (TOTAL KNEE REPLACEMENT), LEFT: ICD-10-CM

## 2024-11-25 DIAGNOSIS — M25.562 CHRONIC PAIN OF LEFT KNEE: ICD-10-CM

## 2024-11-25 PROCEDURE — 3600000010 HC OR TIME - EACH INCREMENTAL 1 MINUTE - PROCEDURE LEVEL FIVE: Performed by: ORTHOPAEDIC SURGERY

## 2024-11-25 PROCEDURE — 88311 DECALCIFY TISSUE: CPT | Mod: TC,GEALAB | Performed by: ORTHOPAEDIC SURGERY

## 2024-11-25 PROCEDURE — A27447 PR TOTAL KNEE ARTHROPLASTY: Performed by: ANESTHESIOLOGY

## 2024-11-25 PROCEDURE — 27447 TOTAL KNEE ARTHROPLASTY: CPT | Performed by: ORTHOPAEDIC SURGERY

## 2024-11-25 PROCEDURE — 2500000004 HC RX 250 GENERAL PHARMACY W/ HCPCS (ALT 636 FOR OP/ED): Performed by: ORTHOPAEDIC SURGERY

## 2024-11-25 PROCEDURE — 73560 X-RAY EXAM OF KNEE 1 OR 2: CPT | Mod: LT

## 2024-11-25 PROCEDURE — A9999 DME SUPPLY OR ACCESSORY, NOS: HCPCS | Performed by: ORTHOPAEDIC SURGERY

## 2024-11-25 PROCEDURE — 7100000009 HC PHASE TWO TIME - INITIAL BASE CHARGE: Performed by: ORTHOPAEDIC SURGERY

## 2024-11-25 PROCEDURE — 64447 NJX AA&/STRD FEMORAL NRV IMG: CPT | Performed by: ANESTHESIOLOGY

## 2024-11-25 PROCEDURE — 7100000002 HC RECOVERY ROOM TIME - EACH INCREMENTAL 1 MINUTE: Performed by: ORTHOPAEDIC SURGERY

## 2024-11-25 PROCEDURE — 2500000005 HC RX 250 GENERAL PHARMACY W/O HCPCS: Performed by: ANESTHESIOLOGY

## 2024-11-25 PROCEDURE — C1776 JOINT DEVICE (IMPLANTABLE): HCPCS | Performed by: ORTHOPAEDIC SURGERY

## 2024-11-25 PROCEDURE — A27447 PR TOTAL KNEE ARTHROPLASTY: Performed by: NURSE ANESTHETIST, CERTIFIED REGISTERED

## 2024-11-25 PROCEDURE — 3700000002 HC GENERAL ANESTHESIA TIME - EACH INCREMENTAL 1 MINUTE: Performed by: ORTHOPAEDIC SURGERY

## 2024-11-25 PROCEDURE — 3600000005 HC OR TIME - INITIAL BASE CHARGE - PROCEDURE LEVEL FIVE: Performed by: ORTHOPAEDIC SURGERY

## 2024-11-25 PROCEDURE — RXMED WILLOW AMBULATORY MEDICATION CHARGE

## 2024-11-25 PROCEDURE — 97110 THERAPEUTIC EXERCISES: CPT | Mod: GP

## 2024-11-25 PROCEDURE — 2500000004 HC RX 250 GENERAL PHARMACY W/ HCPCS (ALT 636 FOR OP/ED): Performed by: ANESTHESIOLOGY

## 2024-11-25 PROCEDURE — 7100000010 HC PHASE TWO TIME - EACH INCREMENTAL 1 MINUTE: Performed by: ORTHOPAEDIC SURGERY

## 2024-11-25 PROCEDURE — 2500000005 HC RX 250 GENERAL PHARMACY W/O HCPCS: Performed by: NURSE PRACTITIONER

## 2024-11-25 PROCEDURE — 2500000004 HC RX 250 GENERAL PHARMACY W/ HCPCS (ALT 636 FOR OP/ED): Performed by: NURSE ANESTHETIST, CERTIFIED REGISTERED

## 2024-11-25 PROCEDURE — 88304 TISSUE EXAM BY PATHOLOGIST: CPT | Performed by: PATHOLOGY

## 2024-11-25 PROCEDURE — A6213 FOAM DRG >16<=48 SQ IN W/BDR: HCPCS | Performed by: ORTHOPAEDIC SURGERY

## 2024-11-25 PROCEDURE — C1713 ANCHOR/SCREW BN/BN,TIS/BN: HCPCS | Performed by: ORTHOPAEDIC SURGERY

## 2024-11-25 PROCEDURE — 73562 X-RAY EXAM OF KNEE 3: CPT | Mod: LEFT SIDE | Performed by: RADIOLOGY

## 2024-11-25 PROCEDURE — 2780000003 HC OR 278 NO HCPCS: Performed by: ORTHOPAEDIC SURGERY

## 2024-11-25 PROCEDURE — 7100000001 HC RECOVERY ROOM TIME - INITIAL BASE CHARGE: Performed by: ORTHOPAEDIC SURGERY

## 2024-11-25 PROCEDURE — 2500000001 HC RX 250 WO HCPCS SELF ADMINISTERED DRUGS (ALT 637 FOR MEDICARE OP): Performed by: NURSE PRACTITIONER

## 2024-11-25 PROCEDURE — 3700000001 HC GENERAL ANESTHESIA TIME - INITIAL BASE CHARGE: Performed by: ORTHOPAEDIC SURGERY

## 2024-11-25 PROCEDURE — 97161 PT EVAL LOW COMPLEX 20 MIN: CPT | Mod: GP

## 2024-11-25 PROCEDURE — 2720000007 HC OR 272 NO HCPCS: Performed by: ORTHOPAEDIC SURGERY

## 2024-11-25 PROCEDURE — 2500000005 HC RX 250 GENERAL PHARMACY W/O HCPCS: Performed by: ORTHOPAEDIC SURGERY

## 2024-11-25 DEVICE — ATTUNE KNEE SYSTEM TIBIAL INSERT ROTATING PLATFORM POSTERIOR STABILIZED 5 6MM AOX
Type: IMPLANTABLE DEVICE | Site: KNEE | Status: FUNCTIONAL
Brand: ATTUNE

## 2024-11-25 DEVICE — ATTUNE KNEE SYSTEM FEMORAL POSTERIOR STABILIZED SIZE 5 LEFT CEMENTED
Type: IMPLANTABLE DEVICE | Site: KNEE | Status: FUNCTIONAL
Brand: ATTUNE

## 2024-11-25 DEVICE — ATTUNE KNEE SYSTEM TIBIAL BASE ROTATING PLATFORM SIZE 5 CEMENTED
Type: IMPLANTABLE DEVICE | Site: KNEE | Status: FUNCTIONAL
Brand: ATTUNE

## 2024-11-25 DEVICE — CEMENT, BONE, SIMPLEX P, RADIOPAQUE, FULL DOSE, 40 GM: Type: IMPLANTABLE DEVICE | Site: KNEE | Status: FUNCTIONAL

## 2024-11-25 RX ORDER — SODIUM CHLORIDE, SODIUM LACTATE, POTASSIUM CHLORIDE, CALCIUM CHLORIDE 600; 310; 30; 20 MG/100ML; MG/100ML; MG/100ML; MG/100ML
100 INJECTION, SOLUTION INTRAVENOUS CONTINUOUS
Status: ACTIVE | OUTPATIENT
Start: 2024-11-25 | End: 2024-11-25

## 2024-11-25 RX ORDER — LIDOCAINE HCL/PF 100 MG/5ML
SYRINGE (ML) INTRAVENOUS AS NEEDED
Status: DISCONTINUED | OUTPATIENT
Start: 2024-11-25 | End: 2024-11-25

## 2024-11-25 RX ORDER — ACETAMINOPHEN 325 MG/1
975 TABLET ORAL ONCE
Status: COMPLETED | OUTPATIENT
Start: 2024-11-25 | End: 2024-11-25

## 2024-11-25 RX ORDER — MIDAZOLAM HYDROCHLORIDE 1 MG/ML
INJECTION, SOLUTION INTRAMUSCULAR; INTRAVENOUS CONTINUOUS PRN
Status: DISCONTINUED | OUTPATIENT
Start: 2024-11-25 | End: 2024-11-25

## 2024-11-25 RX ORDER — ALBUTEROL SULFATE 0.83 MG/ML
2.5 SOLUTION RESPIRATORY (INHALATION) ONCE AS NEEDED
Status: DISCONTINUED | OUTPATIENT
Start: 2024-11-25 | End: 2024-11-25 | Stop reason: HOSPADM

## 2024-11-25 RX ORDER — CELECOXIB 400 MG/1
400 CAPSULE ORAL ONCE
Status: COMPLETED | OUTPATIENT
Start: 2024-11-25 | End: 2024-11-25

## 2024-11-25 RX ORDER — ONDANSETRON HYDROCHLORIDE 2 MG/ML
INJECTION, SOLUTION INTRAVENOUS AS NEEDED
Status: DISCONTINUED | OUTPATIENT
Start: 2024-11-25 | End: 2024-11-25

## 2024-11-25 RX ORDER — CEFAZOLIN 1 G/1
INJECTION, POWDER, FOR SOLUTION INTRAVENOUS AS NEEDED
Status: DISCONTINUED | OUTPATIENT
Start: 2024-11-25 | End: 2024-11-25

## 2024-11-25 RX ORDER — SODIUM CHLORIDE 0.9 G/100ML
IRRIGANT IRRIGATION AS NEEDED
Status: DISCONTINUED | OUTPATIENT
Start: 2024-11-25 | End: 2024-11-25 | Stop reason: HOSPADM

## 2024-11-25 RX ORDER — MELOXICAM 15 MG/1
15 TABLET ORAL DAILY
Start: 2024-11-26

## 2024-11-25 RX ORDER — NAPROXEN SODIUM 220 MG/1
81 TABLET, FILM COATED ORAL 2 TIMES DAILY
Qty: 56 TABLET | Refills: 0 | Status: SHIPPED | OUTPATIENT
Start: 2024-11-25 | End: 2024-12-23

## 2024-11-25 RX ORDER — TRANEXAMIC ACID 100 MG/ML
INJECTION, SOLUTION INTRAVENOUS AS NEEDED
Status: DISCONTINUED | OUTPATIENT
Start: 2024-11-25 | End: 2024-11-25 | Stop reason: HOSPADM

## 2024-11-25 RX ORDER — BUPIVACAINE HYDROCHLORIDE 7.5 MG/ML
INJECTION, SOLUTION INTRASPINAL AS NEEDED
Status: DISCONTINUED | OUTPATIENT
Start: 2024-11-25 | End: 2024-11-25

## 2024-11-25 RX ORDER — PROPOFOL 10 MG/ML
INJECTION, EMULSION INTRAVENOUS CONTINUOUS PRN
Status: DISCONTINUED | OUTPATIENT
Start: 2024-11-25 | End: 2024-11-25

## 2024-11-25 RX ORDER — ACETAMINOPHEN 325 MG/1
650 TABLET ORAL EVERY 4 HOURS PRN
Status: DISCONTINUED | OUTPATIENT
Start: 2024-11-25 | End: 2024-11-25 | Stop reason: HOSPADM

## 2024-11-25 RX ORDER — FENTANYL CITRATE 50 UG/ML
INJECTION, SOLUTION INTRAMUSCULAR; INTRAVENOUS AS NEEDED
Status: DISCONTINUED | OUTPATIENT
Start: 2024-11-25 | End: 2024-11-25

## 2024-11-25 RX ORDER — ONDANSETRON HYDROCHLORIDE 2 MG/ML
8 INJECTION, SOLUTION INTRAVENOUS ONCE
Status: DISCONTINUED | OUTPATIENT
Start: 2024-11-25 | End: 2024-11-25 | Stop reason: HOSPADM

## 2024-11-25 RX ADMIN — CELECOXIB 400 MG: 400 CAPSULE ORAL at 09:16

## 2024-11-25 RX ADMIN — Medication 2 L/MIN: at 12:55

## 2024-11-25 RX ADMIN — SODIUM CHLORIDE, POTASSIUM CHLORIDE, SODIUM LACTATE AND CALCIUM CHLORIDE 100 ML/HR: 600; 310; 30; 20 INJECTION, SOLUTION INTRAVENOUS at 09:16

## 2024-11-25 RX ADMIN — POVIDONE-IODINE 1 APPLICATION: 5 SOLUTION TOPICAL at 09:16

## 2024-11-25 RX ADMIN — ACETAMINOPHEN 975 MG: 325 TABLET ORAL at 09:16

## 2024-11-25 SDOH — HEALTH STABILITY: MENTAL HEALTH: CURRENT SMOKER: 0

## 2024-11-25 ASSESSMENT — PAIN SCALES - GENERAL
PAINLEVEL_OUTOF10: 0 - NO PAIN

## 2024-11-25 ASSESSMENT — ACTIVITIES OF DAILY LIVING (ADL)
ADLS_ADDRESSED: YES
ADL_ASSISTANCE: INDEPENDENT

## 2024-11-25 ASSESSMENT — PAIN - FUNCTIONAL ASSESSMENT
PAIN_FUNCTIONAL_ASSESSMENT: 0-10

## 2024-11-25 ASSESSMENT — COLUMBIA-SUICIDE SEVERITY RATING SCALE - C-SSRS
2. HAVE YOU ACTUALLY HAD ANY THOUGHTS OF KILLING YOURSELF?: NO
1. IN THE PAST MONTH, HAVE YOU WISHED YOU WERE DEAD OR WISHED YOU COULD GO TO SLEEP AND NOT WAKE UP?: NO
6. HAVE YOU EVER DONE ANYTHING, STARTED TO DO ANYTHING, OR PREPARED TO DO ANYTHING TO END YOUR LIFE?: NO

## 2024-11-25 ASSESSMENT — COGNITIVE AND FUNCTIONAL STATUS - GENERAL
MOVING TO AND FROM BED TO CHAIR: A LITTLE
STANDING UP FROM CHAIR USING ARMS: A LITTLE
MOBILITY SCORE: 20
WALKING IN HOSPITAL ROOM: A LITTLE
CLIMB 3 TO 5 STEPS WITH RAILING: A LITTLE

## 2024-11-25 NOTE — ANESTHESIA PROCEDURE NOTES
Peripheral Block    Patient location during procedure: pre-op  Start time: 11/25/2024 9:50 AM  End time: 11/25/2024 10:00 AM  Reason for block: procedure for pain, at surgeon's request and post-op pain management  Staffing  Performed: attending   Authorized by: Rafa Salinas MD    Performed by: Rafa Salinas MD  Preanesthetic Checklist  Completed: patient identified, IV checked, site marked, risks and benefits discussed, surgical consent, monitors and equipment checked, pre-op evaluation and timeout performed   Timeout performed at: 11/25/2024 9:50 AM  Peripheral Block  Patient position: laying flat  Prep: ChloraPrep  Patient monitoring: heart rate, cardiac monitor and continuous pulse ox  Block type: adductor canal  Laterality: left  Injection technique: single-shot  Guidance: ultrasound guided  Infiltration strength: 0.5 %  Dose: 30 mL  Needle  Needle type: pencil-tip   Needle length: 8 cm  Needle localization: ultrasound guidance     image stored in chart  Assessment  Injection assessment: negative aspiration for heme, no paresthesia on injection, incremental injection and local visualized surrounding nerve on ultrasound  Paresthesia pain: none  Heart rate change: no  Slow fractionated injection: yes  Additional Notes  5 mg decadron, 150 mcg of Epinephrine added to 30 ml of Ropivacaine which was used for the block. Midazolam 2 mg given pre-procedure.

## 2024-11-25 NOTE — PROGRESS NOTES
Physical Therapy    Physical Therapy Evaluation & Treatment    Patient Name: Elizabeth Tran  MRN: [id number]  Department:   Room: [facility] OR  [facility] Date: 11/25/2024   Time Calculation  Start Time: 1617  Stop Time: 1644  Time Calculation (min): 27 min    Assessment/Plan   PT Assessment  PT Assessment Results: Decreased strength, Decreased range of motion, Decreased endurance, Impaired balance, Decreased mobility  Rehab Prognosis: Excellent  Barriers to Discharge: None  Evaluation/Treatment Tolerance: Patient tolerated treatment well  Medical Staff Made Aware: Yes  Strengths: Ability to acquire knowledge, Housing layout, Insight into problems, Support of Caregivers  Barriers to Participation:  (None)  End of Session Communication: Bedside nurse  Assessment Comment: Patient cooperative, attending preop joint class demonstrating good understanding of rehab expectations and protocol. Patient has support from family, owns all necessary equipment and has one story home. Rec low intensity PT intervention.  End of Session Patient Position: Up in chair, Alarm off, caregiver present (with RN, prepared for d/c)      PT Plan  Treatment/Interventions: Transfer training, Gait training, Stair training, Strengthening, Endurance training, Range of motion, Therapeutic exercise  PT Plan: Ongoing PT  PT Eval Only Reason: Safe to return home  PT Frequency: 2 times per week  PT Discharge Recommendations: Low intensity level of continued care  Equipment Recommended upon Discharge: Wheeled walker (owns)  PT Recommended Transfer Status: Assist x1  PT - OK to Discharge: Yes (per PT POC)      Subjective     General Visit Information:  General  Reason for Referral: Impaired functional mobility; L TKA  Referred By: Abner Amezcua  Past Medical History Relevant to Rehab: arthritis, TIA, stroke  Family/Caregiver Present: No  Prior to Session Communication: Bedside nurse  Patient Position Received: Bed, 3 rail up, Alarm off, caregiver  present  General Comment: Patient pleasant, cooperative and agreeable to PT eval  Home Living:  Home Living  Type of Home: House  Lives With: Alone (Grandson staying with patient post op)  Home Adaptive Equipment: Walker rolling or standard  Home Layout: One level  Home Access: Stairs to enter with rails  Entrance Stairs-Rails: Both  Entrance Stairs-Number of Steps: 2  Bathroom Shower/Tub: Tub/shower unit (Patient sponge bathes)  Prior Level of Function:  Prior Function Per Pt/Caregiver Report  Level of White: Independent with ADLs and functional transfers, Independent with homemaking with ambulation  Receives Help From: Family  ADL Assistance: Independent  Homemaking Assistance: Independent  Ambulatory Assistance: Independent  Vocational: Full time employment  Prior Function Comments: (+) driving  Precautions:  Precautions  LE Weight Bearing Status: Weight Bearing as Tolerated  Post-Surgical Precautions: Left total knee precautions    Objective   Pain:  Pain Assessment  Pain Assessment: 0-10  0-10 (Numeric) Pain Score: 0 - No pain  Cognition:  Cognition  Overall Cognitive Status: Within Functional Limits  Orientation Level: Oriented X4    General Assessments:  General Observation  General Observation: Patient attended joint preop class    Activity Tolerance  Endurance: Tolerates 30 min exercise with multiple rests    Sensation  Light Touch: No apparent deficits    Strength  Strength Comments: L hip 4/5, L knee 3+/5, L ankle 4/5; R LE 4+/5    Coordination  Movements are Fluid and Coordinated: Yes    Postural Control  Postural Control: Within Functional Limits    Static Sitting Balance  Static Sitting-Balance Support: No upper extremity supported, Feet supported  Static Sitting-Level of Assistance: Independent  Dynamic Sitting Balance  Dynamic Sitting-Balance Support: No upper extremity supported, Feet supported  Dynamic Sitting-Level of Assistance: Independent  Dynamic Sitting-Balance: Forward lean    Static  Standing Balance  Static Standing-Balance Support: Bilateral upper extremity supported  Static Standing-Level of Assistance: Contact guard  Functional Assessments:  ADL  ADL's Addressed: Yes (Restroom use: mod A to change undergarment, CGA for toilet transfer, supervision for hygiene)    Bed Mobility  Bed Mobility: Yes  Bed Mobility 1  Bed Mobility 1: Supine to sitting, Sitting to supine  Level of Assistance 1: Independent    Transfers  Transfer: Yes  Transfer 1  Transfer From 1: Sit to, Stand to  Transfer to 1: Sit, Stand  Technique 1: Sit to stand, Stand to sit  Transfer Device 1: Walker  Transfer Level of Assistance 1: Contact guard    Ambulation/Gait Training  Ambulation/Gait Training Performed: Yes  Ambulation/Gait Training 1  Surface 1: Level tile  Device 1: Rolling walker  Assistance 1: Contact guard  Quality of Gait 1:  (verbal cues for L heel strike with L knee in extension during stance, L knee flexion during swing)  Comments/Distance (ft) 1: 15'; 10'; 25'; 20'    Stairs  Stairs: Yes  Stairs  Rails 1: Bilateral  Device 1: Railing  Support Devices 1: Gait belt  Assistance 1: Contact guard  Comment/Number of Steps 1: 3 (verbal cues for sequencing, step to pattern)    Treatments:   Patient encouraged to complete supine ther ex 3x/day. Reviewed 15  reps each: ankle pumps, quad sets, SLR, hip abd/add, heel slides, SAQ. Patient advised home care will advance ther ex as tolerated   Encouraged to take short duration ambulation bouts hourly during waking hours with the use of 2WW, focusing on gait pattern.   Educated on the importance of avoiding remaining in one position for extended period of time. Encouraged pain and edema control with use of ice, elevation and activity pacing. Patient verbalized understanding.   Reiterated no pillows/towels etc under the knee   Reviewed TKA precautions   Home going packet reviewed and provided to patient. Patient verbalized understanding.     Reviewed car transfer, patient  verbalized understanding. Recommended pushing seat back as far as possible, don't use door as support, recline seat to provide increased space for hip mobility. Sit first and then swivel into vehicle. Plans to d/c into a sedan.     Bed Mobility  Bed Mobility: Yes  Bed Mobility 1  Bed Mobility 1: Supine to sitting, Sitting to supine  Level of Assistance 1: Independent    Ambulation/Gait Training  Ambulation/Gait Training Performed: Yes  Ambulation/Gait Training 1  Surface 1: Level tile  Device 1: Rolling walker  Assistance 1: Contact guard  Quality of Gait 1:  (verbal cues for L heel strike with L knee in extension during stance, L knee flexion during swing)  Comments/Distance (ft) 1: 15'; 10'; 25'; 20'  Transfers  Transfer: Yes  Transfer 1  Transfer From 1: Sit to, Stand to  Transfer to 1: Sit, Stand  Technique 1: Sit to stand, Stand to sit  Transfer Device 1: Walker  Transfer Level of Assistance 1: Contact guard    Stairs  Stairs: Yes  Stairs  Rails 1: Bilateral  Device 1: Railing  Support Devices 1: Gait belt  Assistance 1: Contact guard  Comment/Number of Steps 1: 3 (verbal cues for sequencing, step to pattern)  Outcome Measures:  [facility] Basic Mobility  Turning from your back to your side while in a flat bed without using bedrails: None  Moving from lying on your back to sitting on the side of a flat bed without using bedrails: None  Moving to and from bed to chair (including a wheelchair): A little  Standing up from a chair using your arms (e.g. wheelchair or bedside chair): A little  To walk in hospital room: A little  Climbing 3-5 steps with railing: A little  Basic Mobility - Total Score: 20    Encounter Problems       Encounter Problems (Resolved)       Mobility       Patient will demonstrate good understanding of bed mobility, transfers, ambulation, stair negotiation and HEP for safe home going.   (Met)       Start:  11/25/24    Expected End:  12/09/24    Resolved:  11/25/24                Education  Documentation  Handouts, taught by Beatriz Casper PT at 11/25/2024  4:58 PM.  Learner: Patient  Readiness: Acceptance  Method: Explanation, Demonstration, Handout  Response: Verbalizes Understanding, Demonstrated Understanding    Precautions, taught by Beatriz Casper PT at 11/25/2024  4:58 PM.  Learner: Patient  Readiness: Acceptance  Method: Explanation, Demonstration, Handout  Response: Verbalizes Understanding, Demonstrated Understanding    Body Mechanics, taught by Beatriz Casper PT at 11/25/2024  4:58 PM.  Learner: Patient  Readiness: Acceptance  Method: Explanation, Demonstration, Handout  Response: Verbalizes Understanding, Demonstrated Understanding    Home Exercise Program, taught by Beatriz Casper PT at 11/25/2024  4:58 PM.  Learner: Patient  Readiness: Acceptance  Method: Explanation, Demonstration, Handout  Response: Verbalizes Understanding, Demonstrated Understanding    Mobility Training, taught by Beatriz Casper PT at 11/25/2024  4:58 PM.  Learner: Patient  Readiness: Acceptance  Method: Explanation, Demonstration, Handout  Response: Verbalizes Understanding, Demonstrated Understanding    Education Comments  No comments found.

## 2024-11-25 NOTE — OP NOTE
LEFT TOTAL KNEE REPLACEMENT (L) Operative Note     Date: 2024  OR Location: [facility] OR    Name: Elizabeth Tran, : 1965, Age: 59 y.o., MRN: [id number], Sex: female    Diagnosis  Pre-op Diagnosis      * Chronic pain of left knee [M25.562, G89.29]     * Arthritis of left knee [M17.12] Post-op Diagnosis     * Chronic pain of left knee [M25.562, G89.29]     * Arthritis of left knee [M17.12]     Procedures  LEFT TOTAL KNEE REPLACEMENT  45205 - [place] ARTHRP KNE CONDYLE&PLATU MEDIAL&LAT COMPARTMENTS      Surgeons      * Abner Amezcua - Primary    Resident/Fellow/Other Assistant:  Surgeons and Role:     * Shaun Babin PA-C - Assisting    Staff:   Circulator: Promise James Person: Promise Cosme Circulator: Marcia    Anesthesia Staff: Anesthesiologist: Rafa Salinas MD  CRNA: NATANAEL Barnard-CRNA    Procedure Summary  Anesthesia: Spinal  ASA: II  Estimated Blood Loss: 50mL  Intra-op Medications:   Administrations occurring from 1010 to 1255 on 24:   Medication Name Total Dose   EPINEPHrine (Adrenalin) 0.2 mL, ketorolac (Toradol) 30 mg, ropivacaine (Naropin) 5 mg/mL (0.5 %) 30 mL in sodium chloride 0.9% 25 mL syringe 56.2 mL   sodium chloride 0.9 % irrigation solution 3,000 mL   tranexamic acid (Cyklokapron) injection 3 g   bupivacaine PF 0.75 %-dextrose 8.25 % (Sensorcaine) intrathecal 1.6 mL   ceFAZolin (Ancef) vial 1 g 2 g   dexAMETHasone (Decadron) injection 4 mg/mL 10 mg   dexmedeTOMIDine (Precedex) bolus from bag 8 mcg   fentaNYL (Sublimaze) injection 50 mcg/mL 50 mcg   lidocaine (cardiac) injection 2% prefilled syringe 50 mg   ondansetron (Zofran) 2 mg/mL injection 4 mg   propofol (Diprivan) injection 10 mg/mL 1,301.3 mg   lactated Ringer's infusion Cannot be calculated              Anesthesia Record               Intraprocedure I/O Totals          Intake    Dexmedetomidine 0.00 mL    The total shown is the total volume documented since Anesthesia Start was filed.    lactated Ringer's  infusion 1000.00 mL    Total Intake 1000 mL          Specimen:   ID Type Source Tests Collected by Time   1 : LEFT KNEE CONTENTS Tissue KNEE CONTENS REAMINGS, LEFT SURGICAL PATHOLOGY EXAM Abner Amzecua MD 2024 1125                 Drains and/or Catheters: * None in log *    Tourniquet Times:   * Missing tourniquet times found for documented tourniquets in lo *     Implants:  Implants       Type Name Action Serial No.      Implant CEMENT, BONE, SIMPLEX P, RADIOPAQUE, FULL DOSE, 40 GM - -[id number] - [id number] Implanted [id number]     Joint Knee FEMORAL, ATTUNE PS, SALO, SZ 5, LT - [id number] Implanted      Joint Knee INSERT, ATTUNE PS RP, SZ 5, 6MM - [id number] Implanted      Joint Knee TIBAL BASE, ATTUNE, ROTATING PLATFORM, SALO, SZ 5 - [id number] Implanted               Findings: severe djd    Indications: Elizabeth Tran is an 59 y.o. female who is having surgery for Chronic pain of left knee [M25.562, G89.29]  Arthritis of left knee [M17.12].     The patient was seen in the preoperative area. The risks, benefits, complications, treatment options, non-operative alternatives, expected recovery and outcomes were discussed with the patient. The possibilities of reaction to medication, pulmonary aspiration, injury to surrounding structures, bleeding, recurrent infection, the need for additional procedures, failure to diagnose a condition, and creating a complication requiring transfusion or operation were discussed with the patient. The patient concurred with the proposed plan, giving informed consent.  The site of surgery was properly noted/marked if necessary per policy. The patient has been actively warmed in preoperative area. Preoperative antibiotics have been ordered and given within 1 hours of incision. Venous thrombosis prophylaxis have been ordered including bilateral sequential compression devices and chemical prophylaxis    Procedure Details: Statement of medical necessity:    This is a  59 y.o. female with severe degenerative disease of the knee that has failed non operative management. the risks, benefits and alternatives of total knee arthroplasty were discussed with the patient and they signed informed consent.     No qualified Orthopedic Resident was available to assist with this procedure.  Therefore, the assistance of PA named above, was required throughout this entire procedure.    This PA is specifically trained and experienced in assisting with this procedure.  During the procedure, he actively assisted Dr. Amezcua in completing the operation safely and expeditiously by helping to provide exposure, maintain hemostasis, and served other technical functions, such as suturing, assisting in drilling, etc.  We will be billing for that PA, as a required First Assistant for this procedure.        Description of procedure:   The patient was brought to the operating room and placed on the operating table in the supine position. All bony prominences were well padded. Appropriate preoperative antibiotics were administered. Anesthesia was induced by the anesthesia team and a time out was performed. The patient was identified by name and medical record number and the laterality and the site of surgery were confirmed by all present.     Under pneumatic tourniquet control, a longitudinal anterior skin incision was made with medial parapatellar arthrotomy. Hemostasis was obtained with Bovie electrocautery. Comprehensive exposure the knee was carried out for total knee arthroplasty. The patella was subluxed laterally and the knee placed in a flexed position.      Using intramedullary alignment, the distal femur was cut in a 5° valgus position. The appropriate-sized femoral component was selected based upon intraoperative measurements of the sagittal dimension of the femur. The distal femoral cutting guide was placed perpendicular to Whitesides line and parallel with the transepicondylar axis, with  3° of  external rotation based upon the posterior condylar axis. The distal femur was then finished to accept a posterior stabilized prosthesis.     Attention was then directed to the proximal tibia. Meniscal remnants were excised. Using extramedullary alignment, the proximal tibia was cut perpendicular to its longitudinal axis with a 3° posterior slope. Osteophytes were excised from the posterior femur, medial and lateral femur, and circumferentially about the tibia to avoid soft tissue impingement. The posterior capsule, medial lateral soft tissue structures were injected using combination of Duramorph, Ropivicaine, toradol and epinephrine.Flexion and extension gaps were assessed.     Flexion and extension gaps were equal and rectangular. No ligamentous release was necessary for appropriate balance.     Trial components were placed. Knee achieved full extension and flexion with excellent varus and valgus stability throughout range of motion. The patella tracked centrally. Tibial component rotation was marked, and the tibia finished in the usual fashion to accept an appropriately sized tibial component.     bone surfaces prepared with pulsatile saline irrigation. Final implants were inserted using Simplex cement. Cement was pressurized and excess cement was cleared. Cement was further pressurized with a trial articular surface in place while the knee was in full extension.     The patella was assessed and its surfaces judged appropriate for non prosthetic patellar arthroplasty. Non prosthetic patellar arthroplasty was performed while cement was hardening, with excision of synovium from the periphery of the patella and circumferential electrocautery and excision of ostephytes.      After hardening of cement, mechanics of the knee were again assessed. The final tibial articular surface trial was accepted for final implant sizing.     After irrigation, the definitive tibial articular surface was inserted into the tibial tray  and locking mechanism engaged.     Joint was irrigated with dilute betadaine solution, followed by normal saline.   The arthrotomy was reapproximated using #1 poly and #2 quill sutures. Subcutaneous layer was closed with 2-0 poly, subcuticular layer with 3-0 v-lock, then perineo mesh and glue dressing was placed followed by mepilex, cast padding, and ace wrap.     Patient was awoken from anesthesia and brought to the pacu in stable position.     Post operative plan: patient may bear weight as tolerated, anterior hip precautions, antibiotics and dvt prophylaxis per protocol, standard post operative supportive care    Statement of staff presence: I was present and scrubbed for all critical portions of the procedure and was immediately available during closing      Complications:  None; patient tolerated the procedure well.    Disposition: PACU - hemodynamically stable.  Condition: stable         Additional Details: none    Attending Attestation: I was present and scrubbed for the key portions of the procedure.    Abner Amezcua  Phone Number: [phone]

## 2024-11-25 NOTE — NURSING NOTE
Went to PACU to meet with patient and care giver,  I educated them on discharge plans, discussed DME needs, informed them about  home care appointments and answered all questions.  Plan was for patient to discharge same day.  Patient attended class and I reviewed my contact information and told them I would call in a few days to follow up. I gave her a polar cube as her MERLE benefit.

## 2024-11-25 NOTE — ANESTHESIA PREPROCEDURE EVALUATION
Patient: Elizabeth Tran    Procedure Information       Date/Time: 11/25/24 1010    Procedure: LEFT TOTAL KNEE REPLACEMENT (Left: Knee)    Location: [facility] OR 01 / Virtual [facility] OR    Surgeons: Abner Amezcua MD            Relevant Problems   GI   (+) Acid reflux      Endocrine   (+) Acquired hypothyroidism      Musculoskeletal   (+) Primary osteoarthritis of both knees       Clinical information reviewed:    Allergies                NPO Detail:  NPO/Void Status  Date of Last Liquid: 11/24/24  Date of Last Solid: 11/24/24         Physical Exam    Airway  TM distance: >3 FB  Neck ROM: full     Cardiovascular - normal exam     Dental    Pulmonary - normal exam     Abdominal - normal exam             Anesthesia Plan    History of general anesthesia?: yes  History of complications of general anesthesia?: no    ASA 2     spinal   (Preop AC block consented)  The patient is not a current smoker.  Patient was not previously instructed to abstain from smoking on day of procedure.    intravenous induction   Anesthetic plan and risks discussed with patient.  Use of blood products discussed with patient who consented to blood products.    Plan discussed with CRNA and CAA.

## 2024-11-25 NOTE — ANESTHESIA PROCEDURE NOTES
Spinal Block    Patient location during procedure: OR  Start time: 11/25/2024 10:47 AM  End time: 11/25/2024 10:57 AM  Reason for block: primary anesthetic and procedure for pain  Staffing  Performed: CRNA   Authorized by: Rafa Salinas MD    Performed by: NATANAEL Barnard-CRNA    Preanesthetic Checklist  Completed: patient identified, IV checked, risks and benefits discussed, surgical consent, monitors and equipment checked, pre-op evaluation, timeout performed and sterile techniques followed  Block Timeout  RN/Licensed healthcare professional reads aloud to the Anesthesia provider and entire team: Patient identity, procedure with side and site, patient position, and as applicable the availability of implants/special equipment/special requirements.  Patient on coagulant treatment: no  Timeout performed at: 11/25/2024 10:45 AM  Spinal Block  Patient position: sitting  Prep: Betadine  Sterility prep: cap, drape, gloves, hand hygiene and mask  Sedation level: light sedation  Patient monitoring: blood pressure, heart rate and continuous pulse oximetry  Approach: midline  Vertebral space: L2-3  Injection technique: single-shot  Needle  Needle type: pencil-point (pencan from kit)   Needle gauge: 24 G  Needle length: 3.5 in  Free flowing CSF: yes    Assessment  Sensory level: T6 bilateral  Block outcome: patient comfortable  Procedure assessment: patient sedated but conversant throughout procedure and patient tolerated procedure well with no immediate complications  Additional Notes  Spinal attempt x 2.  Better positioning when patient placed feet on stool.  Attending anesthesiologist went one level up and was able to place spinal easily in that space.  Patient tolerated well.  Surgical level achieved.

## 2024-11-25 NOTE — ANESTHESIA POSTPROCEDURE EVALUATION
Patient: Elizabeth Tran    Procedure Summary       Date: 11/25/24 Room / Location: [facility] OR 01 / [facility] OR    Anesthesia Start: 1041 Anesthesia Stop: 1258    Procedure: LEFT TOTAL KNEE REPLACEMENT (Left: Knee) Diagnosis:       Chronic pain of left knee      Arthritis of left knee      (Chronic pain of left knee [M25.562, G89.29])      (Arthritis of left knee [M17.12])    Surgeons: Abner Amezcua MD Responsible Provider: Rafa Salinas MD    Anesthesia Type: spinal ASA Status: 2            Anesthesia Type: spinal    Vitals Value Taken Time   /66 11/25/24 1255   Temp 36.3 °C (97.3 °F) 11/25/24 1255   Pulse 86 11/25/24 1255   Resp 19 11/25/24 1255   SpO2 98 % 11/25/24 1255       Anesthesia Post Evaluation    Patient location during evaluation: PACU  Patient participation: complete - patient participated  Level of consciousness: awake  Pain management: adequate  Airway patency: patent  Cardiovascular status: acceptable  Respiratory status: acceptable  Hydration status: acceptable  Postoperative Nausea and Vomiting: none        No notable events documented.

## 2024-11-25 NOTE — HH CARE COORDINATION
Home Care received a Referral for Physical Therapy. We have processed the referral for a Start of Care on 11/26/24.     If you have any questions or concerns, please feel free to contact us at [phone]. Follow the prompts, enter your five digit zip code, and you will be directed to your care team on [facility].

## 2024-11-26 ENCOUNTER — HOME CARE VISIT (OUTPATIENT)
Dept: HOME HEALTH SERVICES | Facility: HOME HEALTH | Age: 59
End: 2024-11-26
Payer: COMMERCIAL

## 2024-11-26 VITALS — RESPIRATION RATE: 18 BRPM | DIASTOLIC BLOOD PRESSURE: 80 MMHG | HEART RATE: 72 BPM | SYSTOLIC BLOOD PRESSURE: 136 MMHG

## 2024-11-26 PROCEDURE — G0151 HHCP-SERV OF PT,EA 15 MIN: HCPCS

## 2024-11-26 ASSESSMENT — ENCOUNTER SYMPTOMS
PERSON REPORTING PAIN: PATIENT
PAIN: 1
HIGHEST PAIN SEVERITY IN PAST 24 HOURS: 4/10
PAIN SEVERITY GOAL: 0/10
PAIN LOCATION - PAIN QUALITY: ACHING
PAIN LOCATION - RELIEVING FACTORS: MEDS, ICE
PAIN LOCATION: LEFT KNEE
SUBJECTIVE PAIN PROGRESSION: WAXING AND WANING
PAIN LOCATION - PAIN SEVERITY: 2/10
PAIN LOCATION - PAIN DURATION: ACUTE
LOWEST PAIN SEVERITY IN PAST 24 HOURS: 2/10
PAIN LOCATION - EXACERBATING FACTORS: ACTIVITY
PAIN LOCATION - PAIN FREQUENCY: CONSTANT

## 2024-11-26 ASSESSMENT — ACTIVITIES OF DAILY LIVING (ADL)
AMBULATION_DISTANCE/DURATION_TOLERATED: 40 FEET
ENTERING_EXITING_HOME: STAND BY ASSIST
AMBULATION ASSISTANCE ON FLAT SURFACES: 1

## 2024-11-29 ENCOUNTER — HOME CARE VISIT (OUTPATIENT)
Dept: HOME HEALTH SERVICES | Facility: HOME HEALTH | Age: 59
End: 2024-11-29
Payer: COMMERCIAL

## 2024-12-03 ENCOUNTER — TELEPHONE (OUTPATIENT)
Dept: ORTHOPEDIC SURGERY | Facility: CLINIC | Age: 59
End: 2024-12-03
Payer: COMMERCIAL

## 2024-12-03 NOTE — TELEPHONE ENCOUNTER
Elizabeth called this afternoon, she states she developed a rash on her back after surgery. Its not itchy, but its small blister like bumps. I advised her to make an appointment with her PCP. She does not have one. She state the bumps are getting better and they are not on her surgical leg or her incision. She is not concerned at this time. She just wanted to make us aware.

## 2024-12-04 ENCOUNTER — TELEPHONE (OUTPATIENT)
Dept: INPATIENT UNIT | Facility: HOSPITAL | Age: 59
End: 2024-12-04

## 2024-12-05 ENCOUNTER — APPOINTMENT (OUTPATIENT)
Dept: HOME HEALTH SERVICES | Facility: HOME HEALTH | Age: 59
End: 2024-12-05
Payer: COMMERCIAL

## 2024-12-07 ENCOUNTER — HOME CARE VISIT (OUTPATIENT)
Dept: HOME HEALTH SERVICES | Facility: HOME HEALTH | Age: 59
End: 2024-12-07
Payer: COMMERCIAL

## 2024-12-07 VITALS
SYSTOLIC BLOOD PRESSURE: 124 MMHG | OXYGEN SATURATION: 100 % | TEMPERATURE: 97 F | DIASTOLIC BLOOD PRESSURE: 72 MMHG | HEART RATE: 85 BPM | RESPIRATION RATE: 18 BRPM

## 2024-12-07 PROCEDURE — G0151 HHCP-SERV OF PT,EA 15 MIN: HCPCS

## 2024-12-07 SDOH — HEALTH STABILITY: PHYSICAL HEALTH: PHYSICAL EXERCISE: SITTING

## 2024-12-07 SDOH — HEALTH STABILITY: PHYSICAL HEALTH: RESISTANCE: GREEN BAND FOR HIP ABD

## 2024-12-07 SDOH — HEALTH STABILITY: PHYSICAL HEALTH: PHYSICAL EXERCISE: 15

## 2024-12-07 SDOH — HEALTH STABILITY: PHYSICAL HEALTH: EXERCISE ACTIVITY: APS, MARCHING, LAQ, HEEL SLIDE, HIP ABD/ADD

## 2024-12-07 SDOH — HEALTH STABILITY: PHYSICAL HEALTH: EXERCISE ACTIVITY: APS, GLUTE/QUAD SETS, HEEL SLIDE, SLR, SAQ, HIP ABD

## 2024-12-07 SDOH — HEALTH STABILITY: PHYSICAL HEALTH: EXERCISE ACTIVITIES SETS: 1

## 2024-12-07 SDOH — HEALTH STABILITY: PHYSICAL HEALTH

## 2024-12-07 SDOH — HEALTH STABILITY: PHYSICAL HEALTH: PHYSICAL EXERCISE: SUPINE

## 2024-12-07 ASSESSMENT — ENCOUNTER SYMPTOMS: DENIES PAIN: 1

## 2024-12-09 ENCOUNTER — HOME CARE VISIT (OUTPATIENT)
Dept: HOME HEALTH SERVICES | Facility: HOME HEALTH | Age: 59
End: 2024-12-09
Payer: COMMERCIAL

## 2024-12-09 PROCEDURE — G0151 HHCP-SERV OF PT,EA 15 MIN: HCPCS

## 2024-12-09 SDOH — HEALTH STABILITY: PHYSICAL HEALTH: EXERCISE TYPE: HEP

## 2024-12-09 ASSESSMENT — ENCOUNTER SYMPTOMS
SUBJECTIVE PAIN PROGRESSION: UNCHANGED
PERSON REPORTING PAIN: PATIENT
PAIN LOCATION: LEFT KNEE
PAIN: 1
LOWEST PAIN SEVERITY IN PAST 24 HOURS: 1/10
LOWER EXTREMITY EDEMA: 1
HIGHEST PAIN SEVERITY IN PAST 24 HOURS: 1/10
APPETITE LEVEL: FAIR
PAIN SEVERITY GOAL: 0/10

## 2024-12-09 ASSESSMENT — ACTIVITIES OF DAILY LIVING (ADL): AMBULATION ASSISTANCE ON FLAT SURFACES: 1

## 2024-12-09 NOTE — CASE COMMUNICATION
patient discharged from home health with pt transitioning to op services followuing dr cj hwang. pt will require OP prescription as she did not have one in the packet. pt insurance allowed 3 visits for home care which patient completed today. bandage intact at the time of the visit. pt rom 0-80 degrees.

## 2024-12-10 LAB
LABORATORY COMMENT REPORT: NORMAL
PATH REPORT.FINAL DX SPEC: NORMAL
PATH REPORT.GROSS SPEC: NORMAL
PATH REPORT.RELEVANT HX SPEC: NORMAL
PATH REPORT.TOTAL CANCER: NORMAL

## 2024-12-11 ENCOUNTER — HOSPITAL ENCOUNTER (OUTPATIENT)
Dept: RADIOLOGY | Facility: CLINIC | Age: 59
Discharge: HOME | End: 2024-12-11
Payer: COMMERCIAL

## 2024-12-11 ENCOUNTER — APPOINTMENT (OUTPATIENT)
Dept: ORTHOPEDIC SURGERY | Facility: CLINIC | Age: 59
End: 2024-12-11
Payer: COMMERCIAL

## 2024-12-11 DIAGNOSIS — Z96.652 HISTORY OF TOTAL KNEE ARTHROPLASTY, LEFT: ICD-10-CM

## 2024-12-11 PROCEDURE — 1036F TOBACCO NON-USER: CPT

## 2024-12-11 PROCEDURE — 99024 POSTOP FOLLOW-UP VISIT: CPT

## 2024-12-11 PROCEDURE — 73564 X-RAY EXAM KNEE 4 OR MORE: CPT | Mod: LT

## 2024-12-11 ASSESSMENT — PAIN - FUNCTIONAL ASSESSMENT: PAIN_FUNCTIONAL_ASSESSMENT: NO/DENIES PAIN

## 2024-12-11 NOTE — PROGRESS NOTES
Chief Complaint   Patient presents with    Left Knee - Post-op     11/25/24 LT TKA         This is a 59 y.o. female who is 2 weeks out from left total knee.  Pain is under control with current medications.  No drainage from her incision no fevers or chills.  Progressing well with physical therapy and appropriately improving in function.  Patient is doing great and will be starting outpatient physical therapy soon no other new issues or symptoms.    Left knee examination: Surgical incision well approximated no erythema no drainage.  Stable to varus valgus stress range of motion 10 to 110 degrees extension flexion.  Neurovascular tact distally    X-rays of the knee were reviewed independently interpreted by me today, the show stable total knee arthroplasty no fracture dislocation or loosening    Impression plan: 59 y.o. female 2 weeks out from left total knee.  We discussed continuing physical therapy and home exercise program. Pain medications to be used as needed. Assistive devices as needed per PT. We discussed DVT prophylaxis until 6 weeks. No dentist until 3 months and thereafter dental prophylaxis for life. Activity as tolerated weightbearing as tolerated. I have personally reviewed the OARRS report for the patient. This report is scanned into the electronic medical record. I have considered the risks of abuse, dependence, addiction and diversion. Follow up 4 weeks with xrays.  Placed an order for outpatient physical therapy today.

## 2024-12-17 ENCOUNTER — EVALUATION (OUTPATIENT)
Dept: PHYSICAL THERAPY | Facility: HOSPITAL | Age: 59
End: 2024-12-17
Payer: COMMERCIAL

## 2024-12-17 DIAGNOSIS — M25.562 LEFT KNEE PAIN: Primary | ICD-10-CM

## 2024-12-17 DIAGNOSIS — Z96.652 HISTORY OF TOTAL KNEE ARTHROPLASTY, LEFT: ICD-10-CM

## 2024-12-17 PROCEDURE — 97161 PT EVAL LOW COMPLEX 20 MIN: CPT | Mod: GP | Performed by: PHYSICAL THERAPIST

## 2024-12-17 PROCEDURE — 97110 THERAPEUTIC EXERCISES: CPT | Mod: GP | Performed by: PHYSICAL THERAPIST

## 2024-12-17 SDOH — ECONOMIC STABILITY: FOOD INSECURITY: WITHIN THE PAST 12 MONTHS, YOU WORRIED THAT YOUR FOOD WOULD RUN OUT BEFORE YOU GOT MONEY TO BUY MORE.: NEVER TRUE

## 2024-12-17 SDOH — ECONOMIC STABILITY: FOOD INSECURITY: WITHIN THE PAST 12 MONTHS, THE FOOD YOU BOUGHT JUST DIDN'T LAST AND YOU DIDN'T HAVE MONEY TO GET MORE.: NEVER TRUE

## 2024-12-17 ASSESSMENT — ENCOUNTER SYMPTOMS
LOSS OF SENSATION IN FEET: 0
DEPRESSION: 0
OCCASIONAL FEELINGS OF UNSTEADINESS: 0

## 2024-12-17 ASSESSMENT — PATIENT HEALTH QUESTIONNAIRE - PHQ9
1. LITTLE INTEREST OR PLEASURE IN DOING THINGS: NOT AT ALL
2. FEELING DOWN, DEPRESSED OR HOPELESS: NOT AT ALL
SUM OF ALL RESPONSES TO PHQ9 QUESTIONS 1 AND 2: 0

## 2024-12-17 ASSESSMENT — PAIN - FUNCTIONAL ASSESSMENT: PAIN_FUNCTIONAL_ASSESSMENT: 0-10

## 2024-12-17 ASSESSMENT — PAIN SCALES - GENERAL: PAINLEVEL_OUTOF10: 0 - NO PAIN

## 2024-12-17 NOTE — PROGRESS NOTES
Physical Therapy  Physical Therapy Orthopedic Evaluation    Patient Name: Elizabeth Tran  MRN: [id number]  Encounter Date: 12/17/2024  Time Calculation  Start Time: 1346  Stop Time: 1428  Time Calculation (min): 42 min    Today's Charges  PT Evaluation Time Entry  PT Evaluation (Low) Time Entry: 25  PT Therapeutic Procedures Time Entry  Therapeutic Exercise Time Entry: 17      Insurance:  Visit number: 1 of 1  Authorization info: Auth required  Payor:  EMPLOYEE MEDICAL PLAN / Plan:  EMPLOYEE MEDICAL PLAN CONSUMER [employer] / Product Type: *No Product type* /     Current Problem  Problem List Items Addressed This Visit             ICD-10-CM    Left knee pain - Primary M25.562     Other Visit Diagnoses         Codes    History of total knee arthroplasty, left     Z96.652          1. Left knee pain        2. 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 She has had pain for over 5 years with her pain gradually worsening.   Onset Date: 11/25/2024  MOHAN: Insidious    Current Condition:   Better    Pain:  Pain Assessment: 0-10  0-10 (Numeric) Pain Score: 0 - No pain  Highest: 4/10 pain  Lowest: 0/10 pain  Location: L anterior knee  Description: Tightness and swelling  Aggravating Factors:  prolonged standing and walking  Relieving Factors:  Rest, ice    Relevant Information (PMH  & Previous Tests/Imaging): x-rays  Previous Interventions/Treatments: Physical Therapy- home physical therapy    Prior Level of Function (PLOF)  Patient previously independent with all ADLs  Exercise/Physical Activity: walking  Work/School: Housekeeping [employer]  Hobbies: Baking    Patients Living Environment: Single level with basement; lives alone. Has a dog and cat.     Primary Language: English    Patient's Goal(s) for Therapy: The patient would like to get back to work and not be reliant Tylenol.     Red Flags: Do you have any of the following? No  Fever/chills, unexplained weight changes, dizziness/fainting, unexplained change in bowel or bladder functions, unexplained malaise or muscle weakness, night pain/sweats, numbness or tingling    Objective:  Objective       ROM      Hip AROM (Degrees)      (R)  (L)  Flexion: 100  90       Knee AROM (Degrees)      (R)  (L)  Flexion: 0  78      Extension: 105  Lacks 7 degrees     Ankle AROM (Degrees)      (R)  (L)  Plantarflexion: WFL  WFL      Dorsiflexion: WFL  WFL         Strength Testing    Hip      (R)  (L)  Flexion: 4+/5  4/5          Knee    (R)  (L)  Flexion: 4+/5  4/5      Extension: 4+/5  4/5   Poor L quad set- significant glute compensation     Ankle    (R)  (L)  Plantarflexion: 5/5  4/5      Dorsiflexion: 5/5  4+/5         Functional Screening    Posture: shoulder rounded forward and head forward    Lower Extremity Functional Movements  Transfers: Independent  Gait: antalgic, L lateral trunk lean, Trendelenburg gait, decreased L TKE during midstance  Assistive Device: no device  Balance  Feet Together: 30 seconds  Tandem: R 10 sec, L 4 sec  SLS: R 8 sec L 3 sec    Palpation: L LE edema to the ankle, no calf pain or redness/streaking    Joint Mobility: Guarded with end range L knee flexion/extension joint play    Flexibility: Mod. Restrict. L hamstrings/gastrocs    Observation: Mid patella: (R) 44 cm; (L) 46 cm L  L lateral knee mild redness ([place]): 1.75  cm height by 2.25 width        Special Tests  NT due to recent surgery      Outcome Measures:  Other Measures  Lower Extremity Funtional Score (LEFS): 52/80     EDUCATION:   Individual(s) Educated: Patient   Education Provided: Home exercise program, plan of care, activity modifications, pain management, and injury pathology  Handout(s) Provided: Scanned into chart  Home Program: See below treatment  Risk and Benefits Discussed with Patient/Caregiver/Other: Yes   Patient/Caregiver Demonstrated Understanding: Yes   Plan of Care Discussed and Agreed Upon: Yes   Patient Response to Education: Patient/Caregiver verbalized understanding of information, Patient/Caregiver performed return demonstration of exercises/activities, and Patient/Caregiver asked appropriate questions    Assessment: Patient presents to PT s/p L TKA 11/25/2024 due to OA, resulting in limited participation in pain-free ADLs and inability to perform at their prior level of function. The patient is guarded with end range L knee flexion and extension. PT discussed the importance of frequent ROM and stretching throughout the day. The patient demonstrates decreased L knee ROM, flexibility, balance, strength, and gait. Skilled PT warranted to address the above stated impairments, so the patient can perform FA's without increased pain or difficulty.    PT Assessment Results: Decreased strength, Decreased range of motion, Decreased endurance, Impaired balance, Decreased mobility  Rehab Prognosis: Good  Barriers to Discharge: Fear avoidance behavior    Clinical Presentation: Stable and/or uncomplicated characteristics    Complexity: Low    Plan:  Treatment/Interventions: Cryotherapy, Education/ Instruction, Electrical stimulation, Gait training, Manual therapy, Neuromuscular re-education, Therapeutic activities, Therapeutic exercises  PT Plan: Skilled PT  PT Frequency: 2 times per week  Duration: 6 weeks  Onset Date: 11/25/24  Certification Period Start Date:  12/17/24  Certification Period End Date: 02/25/25  Number of Treatments Authorized: 1 of 1  Rehab Potential: Good  Plan of Care Agreement: Patient    Goals: Set and discussed today  Active       PT Problem       Patient will be independent with HEP.        Start:  12/17/24    Expected End:  12/31/24            Patient will demonstrate >/= L knee 0-110 degrees to improve her ability to perform FA's without difficulty.        Start:  12/17/24    Expected End:  02/25/25            Patient will demonstrate 5/5 strength with L knee flexion and extension in order to negotiate curbs, steps, and ambulate in the community without difficulty.        Start:  12/17/24    Expected End:  02/25/25            Patient will improve on her LEFS by at least 9 points to improve her function.        Start:  12/17/24    Expected End:  02/25/25            Patient will report no >/= 2/10 pain with standing up to 20 minutes in order to cook and clean without difficulty.        Start:  12/17/24    Expected End:  02/25/25                Plan of care was developed with input and agreement by the patient    Treatment Performed:  Access Code: [email or web address]  URL: [email or web address]  Date: 12/17/2024  Prepared by: Marcia Chen    Exercises  - Long Sitting Quad Set with Towel Roll Under Heel  - 3 x daily - 7 x weekly - 2 sets - 10 reps - 5 second hold  - Supine Heel Slide with Strap  - 3 x daily - 7 x weekly - 2 sets - 10 reps - 5 second hold  - Seated Hamstring Stretch with Chair  - 3 x daily - 7 x weekly - 1 sets - 1 reps - 5 minute hold    Ambulatory Screenings Summary       Screening  Frequency  Date Last Completed   Spiritual and Cultural Beliefs   Screening each visit or episode of care 11/25/2024   Falls Risk Screening every ambulatory visit 12/17/2024   Pain Screening annually at primary care visit  12/17/2024   Domestic Violence screening annually at primary care visit 12/17/2024   Depression Screening annually in the  primary care setting 12/17/2024   Suicide Risk Screening annually in the primary care setting 12/17/2024   Nutrition and Food Insecurity   Screening at least annually at primary care visit  12/17/2024   Key Learner annually in the primary care setting 11/25/2024   Drug Screen  11/25/2024  9:03 AM   Alcohol Screen  12/11/2024 12:16 PM   Advance Directive  11/25/2024     Marcia Chen, PT

## 2025-01-08 ENCOUNTER — TREATMENT (OUTPATIENT)
Dept: PHYSICAL THERAPY | Facility: HOSPITAL | Age: 60
End: 2025-01-08
Payer: COMMERCIAL

## 2025-01-08 ENCOUNTER — APPOINTMENT (OUTPATIENT)
Dept: ORTHOPEDIC SURGERY | Facility: CLINIC | Age: 60
End: 2025-01-08
Payer: COMMERCIAL

## 2025-01-08 DIAGNOSIS — Z96.652 HISTORY OF TOTAL KNEE ARTHROPLASTY, LEFT: Primary | ICD-10-CM

## 2025-01-08 DIAGNOSIS — M25.562 LEFT KNEE PAIN: ICD-10-CM

## 2025-01-08 PROCEDURE — 97110 THERAPEUTIC EXERCISES: CPT | Mod: GP,CQ

## 2025-01-08 PROCEDURE — 97140 MANUAL THERAPY 1/> REGIONS: CPT | Mod: GP,CQ

## 2025-01-08 ASSESSMENT — PAIN - FUNCTIONAL ASSESSMENT: PAIN_FUNCTIONAL_ASSESSMENT: 0-10

## 2025-01-08 ASSESSMENT — PAIN SCALES - GENERAL: PAINLEVEL_OUTOF10: 1

## 2025-01-08 NOTE — PROGRESS NOTES
"  Physical Therapy Treatment    Patient Name: Elizabeth Tran  MRN: [id number]  Today's Date: 1/8/2025  Time Calculation  Start Time: 1551  Stop Time: 1640  Time Calculation (min): 49 min        PT Therapeutic Procedures Time Entry  Manual Therapy Time Entry: 15  Neuromuscular Re-Education Time Entry: 3  Therapeutic Exercise Time Entry: 31                Current Problem  1. History of total knee arthroplasty, left        2. Left knee pain            General  Reason for Referral: L knee pain due to TKA 11/25/2024  Referred By: TOM Babin  Past Medical History Relevant to Rehab: CVA 1990 (during pregnancy); Mild CVA 2014, thyroid disorder  Preferred Learning Style: verbal, visual, written    Subjective   Current Condition:   Better  Patient reports she's been trying to do the exercises since her last PT session. States her knee feels tight .     Performing HEP?: Yes    Precautions  Precautions  Medical Precautions: Fall precautions  Post-Surgical Precautions: Left total knee precautions  Pain  Pain Assessment: 0-10  0-10 (Numeric) Pain Score: 1  Pain Location: Knee  Pain Orientation: Left    Objective        Knee AROM: L Flexion 78      Knee PROM       Treatments:    Therapeutic Exercise  Therapeutic Exercise Performed: Yes  Therapeutic Exercise Activity 1: Nu Step Level 1 x5'  Therapeutic Exercise Activity 2: Flexion mobilization on step  Therapeutic Exercise Activity 3: LAQ with opposite knee flexion x20  Therapeutic Exercise Activity 4: AAROM Heel slides x10 10\"  Therapeutic Exercise Activity 5: QS x10 5\"  Therapeutic Exercise Activity 6: SLR 2x10  Therapeutic Exercise Activity 7: S/L Hip ABD 2x10  Therapeutic Exercise Activity 8: HR x20  Therapeutic Exercise Activity 9: Standing Hip ABD x20 R/L  Therapeutic Exercise Activity 10: Standing Hip Extension x20 R/L  Therapeutic Exercise Activity 11: mini squats in // bars x20    Balance/Neuromuscular Re-Education  Balance/Neuromuscular Re-Education Activity Performed: " "Yes  Balance/Neuromuscular Re-Education Activity 1: SLS with opposite toe down 30\" R/L    Manual Therapy  Manual Therapy Performed: Yes  Manual Therapy Activity 1: Seated knee flexion with distraction to increase ROM  Manual Therapy Activity 2: L patellar mobs to increase mobility  Manual Therapy Activity 3: L knee flexion mobs to increase mobility            EDUCATION:   Individual(s) Educated: Patient  Education Provided: HEP  Risk and Benefits Discussed with Patient/Caregiver/Other: Yes   Patient/Caregiver Demonstrated Understanding: Yes   Patient Response to Education: Patient/Caregiver verbalized understanding of information    Assessment: Pt required cuing to relax the L LE during patellar mobilization, had tendency to guard, with good follow through demonstrated. Noted hypomobility with superior/inferior gliding with the L patella. Pt demonstrates limited L knee flexion however able to tolerate manual stretches/mobilizations in attempt to increase ROM.  PT Assessment  PT Assessment Results: Decreased strength, Decreased range of motion, Decreased endurance, Impaired balance, Decreased mobility  Rehab Prognosis: Good    Plan: Continue to progress current POC as tolerated to facilitate ability to perform functional activities.   OP PT Plan  Treatment/Interventions: Cryotherapy, Education/ Instruction, Electrical stimulation, Gait training, Manual therapy, Neuromuscular re-education, Therapeutic activities, Therapeutic exercises  PT Plan: Skilled PT  PT Frequency: 2 times per week  Duration: 6 weeks  Onset Date: 11/25/24  Certification Period Start Date: 12/17/24  Certification Period End Date: 02/25/25  Number of Treatments Authorized: 2 of 10  Rehab Potential: Good  Plan of Care Agreement: Patient    Goals:  Active       PT Problem       Patient will be independent with HEP.        Start:  12/17/24    Expected End:  12/31/24            Patient will demonstrate >/= L knee 0-110 degrees to improve her ability to " perform FA's without difficulty.        Start:  12/17/24    Expected End:  02/25/25            Patient will demonstrate 5/5 strength with L knee flexion and extension in order to negotiate curbs, steps, and ambulate in the community without difficulty.        Start:  12/17/24    Expected End:  02/25/25            Patient will improve on her LEFS by at least 9 points to improve her function.        Start:  12/17/24    Expected End:  02/25/25            Patient will report no >/= 2/10 pain with standing up to 20 minutes in order to cook and clean without difficulty.        Start:  12/17/24    Expected End:  02/25/25                 Matthew Castillo, PTA

## 2025-01-10 ENCOUNTER — TREATMENT (OUTPATIENT)
Dept: PHYSICAL THERAPY | Facility: HOSPITAL | Age: 60
End: 2025-01-10
Payer: COMMERCIAL

## 2025-01-10 DIAGNOSIS — M25.562 LEFT KNEE PAIN: ICD-10-CM

## 2025-01-10 DIAGNOSIS — Z96.652 HISTORY OF TOTAL KNEE ARTHROPLASTY, LEFT: ICD-10-CM

## 2025-01-10 ASSESSMENT — PAIN - FUNCTIONAL ASSESSMENT: PAIN_FUNCTIONAL_ASSESSMENT: 0-10

## 2025-01-10 ASSESSMENT — PAIN SCALES - GENERAL: PAINLEVEL_OUTOF10: 0 - NO PAIN

## 2025-01-10 NOTE — PROGRESS NOTES
"  Physical Therapy Treatment    Patient Name: Elizabeth Tran  MRN: [id number]  Today's Date: 1/13/2025  Time Calculation  Start Time: 1115  Stop Time: 1156  Time Calculation (min): 41 min        PT Therapeutic Procedures Time Entry  Manual Therapy Time Entry: 12  Neuromuscular Re-Education Time Entry: 3  Therapeutic Exercise Time Entry: 26                Current Problem  1. History of total knee arthroplasty, left  Follow Up In Physical Therapy      2. 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     Performing HEP?: Yes    Precautions  Precautions  Medical Precautions: Fall precautions  Post-Surgical Precautions: Left total knee precautions  Pain  Pain Assessment: 0-10  0-10 (Numeric) Pain Score: 0 - No pain    Objective         Knee PROM: L Flexion 80     Treatments:    Therapeutic Exercise  Therapeutic Exercise Performed: Yes  Therapeutic Exercise Activity 1: Nu Step Level 1 x5'  Therapeutic Exercise Activity 2: Flexion mobilization on step x20  Therapeutic Exercise Activity 3: LAQ with opposite knee flexion x20  Therapeutic Exercise Activity 4: AAROM Heel slides x10 10\"  Therapeutic Exercise Activity 5: QS x10 5\"  Therapeutic Exercise Activity 6: Step ups with opposite knee drive 4\" x20  Therapeutic Exercise Activity 7: S/L Hip ABD 2x10  Therapeutic Exercise Activity 8: HR x20  Therapeutic Exercise Activity 9: TKE Black x20  Therapeutic Exercise Activity 10: Hamstring curls Green x20  Therapeutic Exercise Activity 11: mini squats in // bars x20  Therapeutic Exercise Activity 12: side step 12' x2  Therapeutic Exercise Activity 13: Retro walk " 12'x2    Balance/Neuromuscular Re-Education  Balance/Neuromuscular Re-Education Activity Performed: Yes  Balance/Neuromuscular Re-Education Activity 1: tandem walk 6' x4    Manual Therapy  Manual Therapy Performed: Yes  Manual Therapy Activity 1: Seated knee flexion with distraction to increase ROM  Manual Therapy Activity 2: L patellar mobs to increase mobility  Manual Therapy Activity 3: L Knee extension mobs to increase mobility           EDUCATION:   Individual(s) Educated: Patient  Education Provided: soft tissue mobilization around incision area   Risk and Benefits Discussed with Patient/Caregiver/Other: Yes   Patient/Caregiver Demonstrated Understanding: Yes   Patient Response to Education: Patient/Caregiver verbalized understanding of information    Assessment: Pt able to tolerate manual stretches and mobilizations without significant pain at end range. L knee flexion continues to be limited as noted with measurement taken today. Pt demonstrates good heel/toe with gait cycle, with no c/o pain. No change with pain level at the end of PT session.   PT Assessment  PT Assessment Results: Decreased strength, Decreased range of motion, Decreased endurance, Impaired balance, Decreased mobility  Rehab Prognosis: Good    Plan: Continue to progress current POC as tolerated to facilitate ability to perform functional activities.   OP PT Plan  Treatment/Interventions: Cryotherapy, Education/ Instruction, Electrical stimulation, Gait training, Manual therapy, Neuromuscular re-education, Therapeutic activities, Therapeutic exercises  PT Plan: Skilled PT  PT Frequency: 2 times per week  Duration: 6 weeks  Onset Date: 11/25/24  Certification Period Start Date: 12/17/24  Certification Period End Date: 02/25/25  Number of Treatments Authorized: 3 of 10  Rehab Potential: Good  Plan of Care Agreement: Patient    Goals:  Active       PT Problem       Patient will be independent with HEP.        Start:  12/17/24    Expected End:   12/31/24            Patient will demonstrate >/= L knee 0-110 degrees to improve her ability to perform FA's without difficulty.        Start:  12/17/24    Expected End:  02/25/25            Patient will demonstrate 5/5 strength with L knee flexion and extension in order to negotiate curbs, steps, and ambulate in the community without difficulty.        Start:  12/17/24    Expected End:  02/25/25            Patient will improve on her LEFS by at least 9 points to improve her function.        Start:  12/17/24    Expected End:  02/25/25            Patient will report no >/= 2/10 pain with standing up to 20 minutes in order to cook and clean without difficulty.        Start:  12/17/24    Expected End:  02/25/25                 Matthew Castillo, PTA

## 2025-01-13 ENCOUNTER — TREATMENT (OUTPATIENT)
Dept: PHYSICAL THERAPY | Facility: HOSPITAL | Age: 60
End: 2025-01-13
Payer: COMMERCIAL

## 2025-01-13 DIAGNOSIS — M25.562 LEFT KNEE PAIN: ICD-10-CM

## 2025-01-13 DIAGNOSIS — Z96.652 HISTORY OF TOTAL KNEE ARTHROPLASTY, LEFT: ICD-10-CM

## 2025-01-13 PROCEDURE — 97140 MANUAL THERAPY 1/> REGIONS: CPT | Mod: GP | Performed by: PHYSICAL THERAPIST

## 2025-01-13 PROCEDURE — 97110 THERAPEUTIC EXERCISES: CPT | Mod: GP | Performed by: PHYSICAL THERAPIST

## 2025-01-13 ASSESSMENT — PAIN SCALES - GENERAL: PAINLEVEL_OUTOF10: 0 - NO PAIN

## 2025-01-13 ASSESSMENT — PAIN - FUNCTIONAL ASSESSMENT: PAIN_FUNCTIONAL_ASSESSMENT: 0-10

## 2025-01-13 NOTE — PROGRESS NOTES
"  Physical Therapy Treatment    Patient Name: Elizabeth Tran  MRN: [id number]  Encounter Date: 1/13/2025  Time Calculation  Start Time: 1043  Stop Time: 1128  Time Calculation (min): 45 min        PT Therapeutic Procedures Time Entry  Manual Therapy Time Entry: 15  Therapeutic Exercise Time Entry: 30      Current Problem  Problem List Items Addressed This Visit             ICD-10-CM    Left knee pain M25.562     Other Visit Diagnoses         Codes    History of total knee arthroplasty, left     Z96.652          1. History of total knee arthroplasty, left  Follow Up In Physical Therapy      2. Left knee pain  Follow Up In Physical Therapy          General  Reason for Referral: L knee pain due to TKA 11/25/2024  Referred By: TOM Babin  Past Medical History Relevant to Rehab: CVA 1990 (during pregnancy); Mild CVA 2014, thyroid disorder  Preferred Learning Style: verbal, visual, written    Subjective   Current Condition:   Better  Patient reports that she has been working out more. She feels like the L knee is loosening up and she is turning a corner.    Performing HEP?: Yes    Precautions  Precautions  Medical Precautions: Fall precautions  Post-Surgical Precautions: Left total knee precautions  Pain  Pain Assessment: 0-10  0-10 (Numeric) Pain Score: 0 - No pain    Objective     L knee AROM post exercise and manual: Lacks 3 degrees extension; 92 degrees flexion    Treatments:    Therapeutic Exercise  Therapeutic Exercise Performed: Yes  Therapeutic Exercise Activity 1: Scifit bike for L knee ROM Seat 9 x 6'  Therapeutic Exercise Activity 2: Flexion mobilization on step x20  Therapeutic Exercise Activity 3: LAQ with opposite knee flexion x20  Therapeutic Exercise Activity 4: AAROM Heel slides 3x10 10\"  Therapeutic Exercise Activity 5: QS x10 5\"; QS with towel at heel x 10 5\" hold  Therapeutic Exercise Activity 6: (L) knee contract relax x 20 5\" holds  Therapeutic Exercise Activity 8: HR x20  Therapeutic Exercise " Activity 9: TKE Black x20  Therapeutic Exercise Activity 10: Hamstring curls Green x20  Therapeutic Exercise Activity 11: mini squats in // bars x20    Manual Therapy  Manual Therapy Performed: Yes  Manual Therapy Activity 1: Seated knee flexion with distraction to increase ROM  Manual Therapy Activity 2: L patellar mobs to increase mobility  Manual Therapy Activity 3: L Knee extension and flexion Grade III mobs to increase mobility      EDUCATION:   Individual(s) Educated: Patient  Education Provided: Importance of frequent stretching/exercise for ROM  Handout(s) Provided: Scanned into chart  Home Program: Continue to focus on end range motion  Risk and Benefits Discussed with Patient/Caregiver/Other: Yes   Patient/Caregiver Demonstrated Understanding: Yes   Patient Response to Education: Patient/Caregiver verbalized understanding of information, Patient/Caregiver performed return demonstration of exercises/activities, and Patient/Caregiver asked appropriate questions    Assessment:   The patient is making ROM since starting therapy. She is most limited with flexion. PT spent significant time performing joint mobs and stretching into end range. The patient has increased the amount of times she is stretching throughout the day. Patient demonstrating improved quad engagement with quad set but still compensates with glutes when performing TKE.  PT Assessment  PT Assessment Results: Decreased strength, Decreased range of motion, Decreased endurance, Impaired balance, Decreased mobility  Rehab Prognosis: Good    Plan: Continue with POC.   Continue with core stability, LE strengthening, ROM, flexibility, and balance, so the patient can perform FA's without increased pain or difficulty.     OP PT Plan  Treatment/Interventions: Cryotherapy, Education/ Instruction, Electrical stimulation, Gait training, Manual therapy, Neuromuscular re-education, Therapeutic activities, Therapeutic exercises  PT Plan: Skilled PT  PT  Frequency: 2 times per week  Duration: 6 weeks  Onset Date: 11/25/24  Certification Period Start Date: 12/17/24  Certification Period End Date: 02/25/25  Number of Treatments Authorized: 4 of 10  Rehab Potential: Good  Plan of Care Agreement: Patient  Payor:  EMPLOYEE MEDICAL PLAN / Plan:  EMPLOYEE MEDICAL PLAN [employer] / Product Type: *No Product type* /     Visit count this episode: 4 visits    Goals:  Active       PT Problem       Patient will be independent with HEP.        Start:  12/17/24    Expected End:  12/31/24            Patient will demonstrate >/= L knee 0-110 degrees to improve her ability to perform FA's without difficulty.        Start:  12/17/24    Expected End:  02/25/25            Patient will demonstrate 5/5 strength with L knee flexion and extension in order to negotiate curbs, steps, and ambulate in the community without difficulty.        Start:  12/17/24    Expected End:  02/25/25            Patient will improve on her LEFS by at least 9 points to improve her function.        Start:  12/17/24    Expected End:  02/25/25            Patient will report no >/= 2/10 pain with standing up to 20 minutes in order to cook and clean without difficulty.        Start:  12/17/24    Expected End:  02/25/25                 Marcia Chen, PT

## 2025-01-15 ENCOUNTER — TREATMENT (OUTPATIENT)
Dept: PHYSICAL THERAPY | Facility: HOSPITAL | Age: 60
End: 2025-01-15
Payer: COMMERCIAL

## 2025-01-15 DIAGNOSIS — M25.562 LEFT KNEE PAIN: ICD-10-CM

## 2025-01-15 DIAGNOSIS — Z96.652 HISTORY OF TOTAL KNEE ARTHROPLASTY, LEFT: ICD-10-CM

## 2025-01-15 PROCEDURE — 97140 MANUAL THERAPY 1/> REGIONS: CPT | Mod: GP,CQ

## 2025-01-15 PROCEDURE — 97110 THERAPEUTIC EXERCISES: CPT | Mod: GP,CQ

## 2025-01-15 ASSESSMENT — PAIN - FUNCTIONAL ASSESSMENT: PAIN_FUNCTIONAL_ASSESSMENT: 0-10

## 2025-01-15 ASSESSMENT — PAIN SCALES - GENERAL: PAINLEVEL_OUTOF10: 0 - NO PAIN

## 2025-01-15 NOTE — PROGRESS NOTES
"  Physical Therapy Treatment    Patient Name: Elizabeth Tran  MRN: [id number]  Today's Date: 1/15/2025  Time Calculation  Start Time: 1042  Stop Time: 1125  Time Calculation (min): 43 min        PT Therapeutic Procedures Time Entry  Manual Therapy Time Entry: 14  Neuromuscular Re-Education Time Entry: 3  Therapeutic Exercise Time Entry: 26                Current Problem  1. History of total knee arthroplasty, left  Follow Up In Physical Therapy      2. Left knee pain  Follow Up In Physical Therapy          General  Reason for Referral: L knee pain due to TKA 11/25/2024  Referred By: TOM Babin  Past Medical History Relevant to Rehab: CVA 1990 (during pregnancy); Mild CVA 2014, thyroid disorder  Preferred Learning Style: verbal, visual, written    Subjective   Current Condition:   Same  Patient reports she continues to work hard with performing the stretches at home. States she is now able to squat down with less discomfort in the L knee.     Performing HEP?: Yes    Precautions  Precautions  Medical Precautions: Fall precautions  Post-Surgical Precautions: Left total knee precautions  Pain  Pain Assessment: 0-10  0-10 (Numeric) Pain Score: 0 - No pain    Objective      Knee AROM: L Flexion 90       Treatments:    Therapeutic Exercise  Therapeutic Exercise Performed: Yes  Therapeutic Exercise Activity 1: Scifit bike for L knee ROM Seat 9 x 6'  Therapeutic Exercise Activity 2: Flexion mobilization on step x20  Therapeutic Exercise Activity 3: LAQ with opposite knee flexion x20  Therapeutic Exercise Activity 4: AAROM Heel slides 3x10 10\"  Therapeutic Exercise Activity 5: QS x10 5\"; QS with towel at heel x 10 5\" hold  Therapeutic Exercise Activity 7: Standing Hip ABD Yellow 2x10 R/L  Therapeutic Exercise Activity 8: HR x20  Therapeutic Exercise Activity 9: TKE Black x20  Therapeutic Exercise Activity 10: Hamstring curls Green x20  Therapeutic Exercise Activity 11: mini squats in // bars x20  Therapeutic Exercise Activity " 12: Standing Hip Extension Yellow 2x10 R/L    Balance/Neuromuscular Re-Education  Balance/Neuromuscular Re-Education Activity Performed: Yes  Balance/Neuromuscular Re-Education Activity 1: Split stance rocking 1'  Balance/Neuromuscular Re-Education Activity 2: Airex chops 4# MB x15 R/L    Manual Therapy  Manual Therapy Performed: Yes  Manual Therapy Activity 1: Seated knee flexion with distraction to increase ROM  Manual Therapy Activity 2: L patellar mobs to increase mobility  Manual Therapy Activity 3: L Knee extension and flexion Grade III mobs to increase mobility              EDUCATION:   Individual(s) Educated: Patient  Education Provided:   Risk and Benefits Discussed with Patient/Caregiver/Other: Yes   Patient/Caregiver Demonstrated Understanding: Yes   Patient Response to Education: Patient/Caregiver verbalized understanding of information    Assessment: Pt demonstrates good tolerance with manual stretches and mobilizations performed today, to increase pt's ROM and mobility. Pt demonstrating improved gait cycle with decreased gait deviations noted when ambulating in the clinic today. Hypomobility noted with superior/inferior patellar mobs.   PT Assessment  PT Assessment Results: Decreased strength, Decreased range of motion, Decreased endurance, Impaired balance, Decreased mobility  Rehab Prognosis: Good    Plan: Continue to progress current POC as tolerated to facilitate ability to perform functional activities.   OP PT Plan  Treatment/Interventions: Cryotherapy, Education/ Instruction, Electrical stimulation, Gait training, Manual therapy, Neuromuscular re-education, Therapeutic activities, Therapeutic exercises  PT Plan: Skilled PT  PT Frequency: 2 times per week  Duration: 6 weeks  Onset Date: 11/25/24  Certification Period Start Date: 12/17/24  Certification Period End Date: 02/25/25  Number of Treatments Authorized: 5 of 10  Rehab Potential: Good  Plan of Care Agreement: Patient    Goals:  Active        PT Problem       Patient will be independent with HEP.        Start:  12/17/24    Expected End:  12/31/24            Patient will demonstrate >/= L knee 0-110 degrees to improve her ability to perform FA's without difficulty.        Start:  12/17/24    Expected End:  02/25/25            Patient will demonstrate 5/5 strength with L knee flexion and extension in order to negotiate curbs, steps, and ambulate in the community without difficulty.        Start:  12/17/24    Expected End:  02/25/25            Patient will improve on her LEFS by at least 9 points to improve her function.        Start:  12/17/24    Expected End:  02/25/25            Patient will report no >/= 2/10 pain with standing up to 20 minutes in order to cook and clean without difficulty.        Start:  12/17/24    Expected End:  02/25/25                 Matthew Castillo, PTA

## 2025-01-16 ENCOUNTER — HOSPITAL ENCOUNTER (OUTPATIENT)
Dept: RADIOLOGY | Facility: CLINIC | Age: 60
Discharge: HOME | End: 2025-01-16
Payer: COMMERCIAL

## 2025-01-16 ENCOUNTER — APPOINTMENT (OUTPATIENT)
Dept: ORTHOPEDIC SURGERY | Facility: CLINIC | Age: 60
End: 2025-01-16
Payer: COMMERCIAL

## 2025-01-16 DIAGNOSIS — Z96.652 S/P TOTAL KNEE ARTHROPLASTY, LEFT: ICD-10-CM

## 2025-01-16 PROCEDURE — 73564 X-RAY EXAM KNEE 4 OR MORE: CPT | Mod: LT

## 2025-01-16 PROCEDURE — 73562 X-RAY EXAM OF KNEE 3: CPT | Mod: RT

## 2025-01-16 ASSESSMENT — PAIN - FUNCTIONAL ASSESSMENT: PAIN_FUNCTIONAL_ASSESSMENT: NO/DENIES PAIN

## 2025-01-16 NOTE — PROGRESS NOTES
Chief Complaint   Patient presents with    Left Knee - Post-op     11/25/24 LT TKA           This is a 59 y.o. female who is 7 weeks out from left total knee.  No drainage from her incision no fevers or chills.  Progressing well with physical therapy and appropriately improving in function.  Patient is doing great but was not able to get into outpatient physical therapy at first because they were too busy.  She has been doing well with her home exercise program.  No other new issues or symptoms.    Left knee examination: Surgical incision well healed no erythema no drainage.  Stable to varus valgus stress range of motion 5 to 90 degrees extension flexion.  Neurovascular tact distally    X-rays of the knee were reviewed independently interpreted by me today, the show stable total knee arthroplasty no fracture dislocation or loosening    Impression plan: 59 y.o. female 7 weeks out from left total knee.  We discussed continuing physical therapy and home exercise program. Pain medications to be used as needed. Assistive devices as needed per PT. We discussed DVT prophylaxis until 6 weeks. No dentist until 3 months and thereafter dental prophylaxis for life. Activity as tolerated weightbearing as tolerated. I have personally reviewed the [facility] report for the patient. This report is scanned into the electronic medical record. I have considered the risks of abuse, dependence, addiction and diversion. Follow up 3 months with xrays.  Patient is doing great and should continue with physical therapy and work on range of motion.

## 2025-01-16 NOTE — LETTER
January 16, 2025     Patient: Elizabeth Tran   YOB: 1965   Date of Visit: 1/16/2025       To Whom It May Concern:    It is my medical opinion that Elizabeth Tran may return to work on February 3rd, 2025 with no limitations, but may need some breaks if needed. She will be a little over 9 weeks out from left total knee replacement and will still be sore in this stage of the recovery period .    If you have any questions or concerns, please don't hesitate to call.         Sincerely,        Shaun Babin PA-C    CC: No Recipients

## 2025-01-20 ENCOUNTER — DOCUMENTATION (OUTPATIENT)
Dept: PHYSICAL THERAPY | Facility: HOSPITAL | Age: 60
End: 2025-01-20
Payer: COMMERCIAL

## 2025-01-20 ENCOUNTER — APPOINTMENT (OUTPATIENT)
Dept: PHYSICAL THERAPY | Facility: HOSPITAL | Age: 60
End: 2025-01-20
Payer: COMMERCIAL

## 2025-01-20 NOTE — PROGRESS NOTES
Physical Therapy                 Therapy Communication Note    Patient Name: Elizabeth Tran  MRN: [id number]  Department:   Room: Room/bed info not found  Today's Date: 1/20/2025     Discipline: Physical Therapy          Missed Visit Reason:  d/t lack of transportation     Missed Time: Cancel 1:00

## 2025-01-23 ENCOUNTER — TREATMENT (OUTPATIENT)
Dept: PHYSICAL THERAPY | Facility: HOSPITAL | Age: 60
End: 2025-01-23
Payer: COMMERCIAL

## 2025-01-23 DIAGNOSIS — M25.562 LEFT KNEE PAIN: ICD-10-CM

## 2025-01-23 DIAGNOSIS — Z96.652 HISTORY OF TOTAL KNEE ARTHROPLASTY, LEFT: ICD-10-CM

## 2025-01-23 PROCEDURE — 97140 MANUAL THERAPY 1/> REGIONS: CPT | Mod: GP | Performed by: PHYSICAL THERAPIST

## 2025-01-23 PROCEDURE — 97110 THERAPEUTIC EXERCISES: CPT | Mod: GP | Performed by: PHYSICAL THERAPIST

## 2025-01-23 ASSESSMENT — PAIN SCALES - GENERAL: PAINLEVEL_OUTOF10: 0 - NO PAIN

## 2025-01-23 ASSESSMENT — PAIN - FUNCTIONAL ASSESSMENT: PAIN_FUNCTIONAL_ASSESSMENT: 0-10

## 2025-01-23 NOTE — PROGRESS NOTES
Physical Therapy Treatment    Patient Name: Elizabeth Tran  MRN: [id number]  Encounter Date: 1/23/2025  Time Calculation  Start Time: 0826  Stop Time: 0913  Time Calculation (min): 47 min    PT Therapeutic Procedures Time Entry  Manual Therapy Time Entry: 10  Therapeutic Exercise Time Entry: 37      Current Problem  Problem List Items Addressed This Visit             ICD-10-CM    Left knee pain M25.562     Other Visit Diagnoses         Codes    History of total knee arthroplasty, left     Z96.652          1. History of total knee arthroplasty, left  Follow Up In Physical Therapy      2. Left knee pain  Follow Up In Physical Therapy          General  Reason for Referral: L knee pain due to TKA 11/25/2024  Referred By: TOM Babin  Past Medical History Relevant to Rehab: CVA 1990 (during pregnancy); Mild CVA 2014, thyroid disorder  Preferred Learning Style: verbal, visual, written    Subjective   Current Condition:   Better  Patient reports that she saw the surgeon. She is going to be returning back to work the first week of February 2025. Her knee pain is subsiding and it only gets stiffness.     Performing HEP?: Yes    Precautions  Precautions  Medical Precautions: Fall precautions  Post-Surgical Precautions: Left total knee precautions  Pain  Pain Assessment: 0-10  0-10 (Numeric) Pain Score: 0 - No pain    Objective   L knee AROM post exercise and manual: Lacks 2 degrees extension; 94 degrees flexion; 95 degrees flexion AAROM after manual    Treatments:    Therapeutic Exercise  Therapeutic Exercise Performed: Yes  Therapeutic Exercise Activity 1: Scifit bike for L knee ROM Seat 9 x 5'  Therapeutic Exercise Activity 2: Flexion mobilization on step x20  Therapeutic Exercise Activity 3: Star trac HS curls 45# x 20  Therapeutic Exercise Activity 4: Star trac Leg extension 20# x 20  Therapeutic Exercise Activity 5: BOSU mini lunges x 20 ea. R/L  Therapeutic Exercise Activity 6: TKE Black with calf raise  "x20  Therapeutic Exercise Activity 7: Sit to stands 5# KB 2 x 10  Therapeutic Exercise Activity 8: Side steps yellow band 3 x 8 ft. ea. R/L  Therapeutic Exercise Activity 9: Monster walks yellow 3 x 8 ft. ea. F/B  Therapeutic Exercise Activity 10: AAROM Heel slides 3x10 10\"    Manual Therapy  Manual Therapy Performed: Yes  Manual Therapy Activity 1: Seated knee flexion with distraction to increase ROM  Manual Therapy Activity 2: L patellar mobs to increase mobility  Manual Therapy Activity 3: L Knee extension and flexion Grade III mobs to increase mobility      EDUCATION:   Individual(s) Educated: Patient  Education Provided: End range stretching  Handout(s) Provided: Scanned into chart  Home Program: Continue HEP  Risk and Benefits Discussed with Patient/Caregiver/Other: Yes   Patient/Caregiver Demonstrated Understanding: Yes   Patient Response to Education: Patient/Caregiver verbalized understanding of information, Patient/Caregiver performed return demonstration of exercises/activities, and Patient/Caregiver asked appropriate questions    Assessment:   The patient is making slow progress towards full L knee AROM. PT reviewed self end range stretches for home. Her L knee strength is increasing and able to use the Star Trac today without difficulty. Her gait is normalizing, and she is ambulating community distances with greater ease.  PT Assessment  PT Assessment Results: Decreased strength, Decreased range of motion, Decreased endurance, Impaired balance, Decreased mobility  Rehab Prognosis: Good    Plan: Continue with POC.   Continue with core stability, LE strengthening, ROM, flexibility, and balance, so the patient can perform FA's without increased pain or difficulty.     OP PT Plan  Treatment/Interventions: Cryotherapy, Education/ Instruction, Electrical stimulation, Gait training, Manual therapy, Neuromuscular re-education, Therapeutic activities, Therapeutic exercises  PT Plan: Skilled PT  PT Frequency: 2 " times per week  Duration: 6 weeks  Onset Date: 11/25/24  Certification Period Start Date: 12/17/24  Certification Period End Date: 02/25/25  Number of Treatments Authorized: 6 of 10  Rehab Potential: Good  Plan of Care Agreement: Patient  Payor:  EMPLOYEE MEDICAL PLAN / Plan:  EMPLOYEE MEDICAL PLAN [employer]SUMER [employer] / Product Type: *No Product type* /     Visit count this episode: 6 visits    Goals:  Active       PT Problem       Patient will be independent with HEP.        Start:  12/17/24    Expected End:  12/31/24            Patient will demonstrate >/= L knee 0-110 degrees to improve her ability to perform FA's without difficulty.        Start:  12/17/24    Expected End:  02/25/25            Patient will demonstrate 5/5 strength with L knee flexion and extension in order to negotiate curbs, steps, and ambulate in the community without difficulty.        Start:  12/17/24    Expected End:  02/25/25            Patient will improve on her LEFS by at least 9 points to improve her function.        Start:  12/17/24    Expected End:  02/25/25            Patient will report no >/= 2/10 pain with standing up to 20 minutes in order to cook and clean without difficulty.        Start:  12/17/24    Expected End:  02/25/25                 Marcia Chen, PT

## 2025-01-28 ENCOUNTER — TREATMENT (OUTPATIENT)
Dept: PHYSICAL THERAPY | Facility: HOSPITAL | Age: 60
End: 2025-01-28
Payer: COMMERCIAL

## 2025-01-28 DIAGNOSIS — M25.562 LEFT KNEE PAIN: ICD-10-CM

## 2025-01-28 DIAGNOSIS — Z96.652 HISTORY OF TOTAL KNEE ARTHROPLASTY, LEFT: ICD-10-CM

## 2025-01-28 PROCEDURE — 97110 THERAPEUTIC EXERCISES: CPT | Mod: GP,CQ

## 2025-01-28 PROCEDURE — 97140 MANUAL THERAPY 1/> REGIONS: CPT | Mod: GP,CQ

## 2025-01-28 ASSESSMENT — PAIN SCALES - GENERAL: PAINLEVEL_OUTOF10: 0 - NO PAIN

## 2025-01-28 ASSESSMENT — PAIN - FUNCTIONAL ASSESSMENT: PAIN_FUNCTIONAL_ASSESSMENT: 0-10

## 2025-01-28 NOTE — PROGRESS NOTES
Physical Therapy Treatment    Patient Name: Elizabeth Tran  MRN: [id number]  Today's Date: 1/28/2025  Time Calculation  Start Time: 0920  Stop Time: 1015  Time Calculation (min): 55 min        PT Therapeutic Procedures Time Entry  Manual Therapy Time Entry: 12  Neuromuscular Re-Education Time Entry: 5  Therapeutic Exercise Time Entry: 38                Current Problem  1. History of total knee arthroplasty, left  Follow Up In Physical Therapy      2. Left knee pain  Follow Up In Physical Therapy          General  Reason for Referral: L knee pain due to TKA 11/25/2024  Referred By: TOM Babin  Past Medical History Relevant to Rehab: CVA 1990 (during pregnancy); Mild CVA 2014, thyroid disorder  Preferred Learning Style: verbal, visual, written    Subjective   Current Condition:   Better  Patient reports she is not having pain, but she feels a little swollen today, and she feels some soreness in her calf muscle.    Performing HEP?: Yes    Precautions  Precautions  Medical Precautions: Fall precautions  Post-Surgical Precautions: Left total knee precautions    Pain  Pain Assessment: 0-10  0-10 (Numeric) Pain Score: 0 - No pain    Objective   KNEE  Knee AROM   0-95 degrees L knee after stretch  Gait   No assistive device, slight limp on L, c/o pain on R with squating  Flexibility   Tight in gastroc/soleus  Treatments:    Therapeutic Exercise  Therapeutic Exercise Performed: Yes  Therapeutic Exercise Activity 1: Scifit bike for L knee ROM Seat 9 x 5'  Therapeutic Exercise Activity 2: Flexion mobilization on step x20  Therapeutic Exercise Activity 3: Star trac HS curls 45# x 20  Therapeutic Exercise Activity 4: Star trac Leg extension 20# x 20  Therapeutic Exercise Activity 5: BOSU mini lunges x 20 ea. R/L  Therapeutic Exercise Activity 6: TKE Black with calf raise x20  Therapeutic Exercise Activity 7: Sit to stands 5# KB 2 x 10  Therapeutic Exercise Activity 8: Side steps yellow band 6 x 8 ft. ea. R/L  Therapeutic  "Exercise Activity 9: Monster walks yellow 3 x 8 ft. ea. F/B  Therapeutic Exercise Activity 10: AAROM Heel slides 3x10 10\"  Therapeutic Exercise Activity 13: step ups x 15 4 inch  Therapeutic Exercise Activity 14: step downs eccentric x 15  Therapeutic Exercise Activity 15: gastroc/soleus stretch 30 sec. x 2 ea.    Balance/Neuromuscular Re-Education  Balance/Neuromuscular Re-Education Activity Performed: Yes  Balance/Neuromuscular Re-Education Activity 1: Split stance rocking 1'    Manual Therapy  Manual Therapy Performed: Yes  Manual Therapy Activity 1: Seated knee flexion with distraction to increase ROM  Manual Therapy Activity 2: L patellar mobs to increase mobility    EDUCATION:   Outpatient Education  Individual(s) Educated: Patient  Education Provided: Other (signs and symptoms of a blood clot)  Patient/Caregiver Demonstrated Understanding: yes  Education Comment: tested pt. for signs of a thrombosis, and discussed with patient what to look for. there are no signs at this time.    Assessment: pt. Able to tolerate all exercises, but required Vcs for proper technique, and to limit compensatory movements. Pt reported that stretching and manual therapy felt good, and had no increased pain. Pt. Demonstrated a slight increase in L knee flex. This day.  PT Assessment  PT Assessment Results: Decreased strength, Decreased range of motion, Decreased endurance, Impaired balance, Decreased mobility  Rehab Prognosis: Good    Plan: continue with PT POC with focus on increasing ROM and strength on L LE for return to normal functional activities with less pain.  OP PT Plan  Treatment/Interventions: Cryotherapy, Education/ Instruction, Electrical stimulation, Gait training, Manual therapy, Neuromuscular re-education, Therapeutic activities, Therapeutic exercises  PT Plan: Skilled PT  PT Frequency: 2 times per week  Duration: 6 weeks  Onset Date: 11/25/24  Certification Period Start Date: 12/17/24  Certification Period End " Date: 02/25/25  Number of Treatments Authorized: 7 of 10  Rehab Potential: Good  Plan of Care Agreement: Patient    Goals:  Active       PT Problem       Patient will be independent with HEP.        Start:  12/17/24    Expected End:  12/31/24            Patient will demonstrate >/= L knee 0-110 degrees to improve her ability to perform FA's without difficulty.        Start:  12/17/24    Expected End:  02/25/25            Patient will demonstrate 5/5 strength with L knee flexion and extension in order to negotiate curbs, steps, and ambulate in the community without difficulty.        Start:  12/17/24    Expected End:  02/25/25            Patient will improve on her LEFS by at least 9 points to improve her function.        Start:  12/17/24    Expected End:  02/25/25            Patient will report no >/= 2/10 pain with standing up to 20 minutes in order to cook and clean without difficulty.        Start:  12/17/24    Expected End:  02/25/25                 Elza Higgins, PTA

## 2025-01-30 ENCOUNTER — APPOINTMENT (OUTPATIENT)
Dept: PHYSICAL THERAPY | Facility: HOSPITAL | Age: 60
End: 2025-01-30
Payer: COMMERCIAL

## 2025-01-30 ENCOUNTER — DOCUMENTATION (OUTPATIENT)
Dept: PHYSICAL THERAPY | Facility: HOSPITAL | Age: 60
End: 2025-01-30
Payer: COMMERCIAL

## 2025-01-30 NOTE — PROGRESS NOTES
Physical Therapy                 Therapy Communication Note    Patient Name: Elizabeth Tran  MRN: [id number]  Department: [facility]  Room: Room/bed info not found  Today's Date: 1/30/2025     Discipline: Physical Therapy    Missed Visit Reason:  Cancel    Missed Time: 2:30 PM 1/30/2025    Comment: [facility]

## 2025-02-18 ENCOUNTER — TELEPHONE (OUTPATIENT)
Facility: CLINIC | Age: 60
End: 2025-02-18
Payer: COMMERCIAL

## 2025-02-18 ENCOUNTER — APPOINTMENT (OUTPATIENT)
Dept: PHYSICAL THERAPY | Facility: HOSPITAL | Age: 60
End: 2025-02-18
Payer: COMMERCIAL

## 2025-02-18 NOTE — TELEPHONE ENCOUNTER
Patient called and stated she wanted  to know after she started therapy her knee kept swelling and was bruising worse. Patient tried to get in earlier but her gps would not work so she had her Therapy check it. Therapy told her an incision broke open under her skin. Patient wanted to make us aware.   11/25/24 LEFT TOTAL KNEE REPLACEMENT

## 2025-03-04 ENCOUNTER — DOCUMENTATION (OUTPATIENT)
Dept: PHYSICAL THERAPY | Facility: HOSPITAL | Age: 60
End: 2025-03-04
Payer: COMMERCIAL

## 2025-03-04 NOTE — PROGRESS NOTES
Physical Therapy    Discharge Summary    Name: Elizabeth Tran  MRN: [id number]  : 1965  Date: 26    Discharge Summary: PT    Discharge Information: Date of discharge 3/4/2025, Date of last visit 2025, Date of evaluation 2024, Number of attended visits 7, Referred by Shaun Babin PA-C, and Referred for L TKA    Therapy Summary: Patient presents to clinic s/p L TKA 2024 due to OA. She has had pain for over 5 years with her pain gradually worsening.     Discharge Status: Patient demonstrates good improvement in L LE strength, ROM, and gait but not WNL at time of last visit. She has returned to work.     Rehab Discharge Reason: Progress plateaued; further improvement possible

## 2025-03-06 ENCOUNTER — APPOINTMENT (OUTPATIENT)
Dept: ORTHOPEDIC SURGERY | Facility: CLINIC | Age: 60
End: 2025-03-06
Payer: COMMERCIAL

## 2025-04-03 ENCOUNTER — HOSPITAL ENCOUNTER (OUTPATIENT)
Dept: RADIOLOGY | Facility: CLINIC | Age: 60
Discharge: HOME | End: 2025-04-03
Payer: COMMERCIAL

## 2025-04-03 ENCOUNTER — APPOINTMENT (OUTPATIENT)
Dept: ORTHOPEDIC SURGERY | Facility: CLINIC | Age: 60
End: 2025-04-03
Payer: COMMERCIAL

## 2025-04-03 DIAGNOSIS — Z96.652 S/P TOTAL KNEE ARTHROPLASTY, LEFT: ICD-10-CM

## 2025-04-03 DIAGNOSIS — M25.562 ACUTE PAIN OF LEFT KNEE: ICD-10-CM

## 2025-04-03 PROCEDURE — 1036F TOBACCO NON-USER: CPT

## 2025-04-03 PROCEDURE — 99214 OFFICE O/P EST MOD 30 MIN: CPT

## 2025-04-03 PROCEDURE — 73564 X-RAY EXAM KNEE 4 OR MORE: CPT | Mod: LT

## 2025-04-03 PROCEDURE — 73562 X-RAY EXAM OF KNEE 3: CPT | Mod: RT

## 2025-04-03 RX ORDER — MELOXICAM 15 MG/1
15 TABLET ORAL DAILY
Qty: 30 TABLET | Refills: 1 | Status: SHIPPED | OUTPATIENT
Start: 2025-04-03

## 2025-04-03 ASSESSMENT — PAIN - FUNCTIONAL ASSESSMENT: PAIN_FUNCTIONAL_ASSESSMENT: NO/DENIES PAIN

## 2025-04-03 NOTE — PROGRESS NOTES
Doing great says she has painn in her right knee but does not want to get the injection today since she is trying to lower her medical bills. Prescribed meloxicam and said she will return for a shot if it gets bad enough  This is a consultation from Dr. Jadiel Davis DO for   Chief Complaint   Patient presents with    Left Knee - Follow-up     11/25/24-LT TKA       This is a 59 y.o. female who presents for evaluation of her left knee that was replaced little over 4 months ago.  Patient states that she is doing great with her left knee but is having some pain in her right knee here and there.  She states her right knee has good days and bad days.  Patient states that she does not want an injection in her right knee today because she is trying to pay off her medical bills and keep them low.  Patient also states that she has a bruise that formed at the top of her incision and is unsure where it came from.  She states that the coloration can sometimes get dark purple when she ices and also can seem to disappear as well.  Patient denies numbness tingling in her distal extremities.  Patient denies fevers chills.    Physical Exam    There has been no interval change in this patient's past medical, surgical, medications, allergies, family history or social history since the most recent visit to a provider within our department. 14 point review of systems was performed, reviewed, and negative except for pertinent positives documented in the history of present illness.     Constitutional: well developed, well nourished female in no acute distress  Psychiatric: normal mood, appropriate affect  Eyes: sclera anicteric  HENT: normocephalic/atraumatic  CV: regular rate and rhythm   Respiratory: non labored breathing  Integumentary: no rash  Neurological: moves all extremities    Left knee: Surgical incision is well-healed.  There are blanchable telangiectasias at the superior aspect of the incision and is not painful to palpation.   No discharge from incision.  Patient's range of motion is 0 to 120 degrees extension flexion without pain.  Neurovascular intact distally.      Imaging:  Xrays were ordered by me, they were reviewed and independently interpreted by me today, they show stable left total knee replacement without any presence of acute fracture or dislocation.  Right knee has severe joint space degeneration especially on the lateral compartment.  No presence of acute fracture or dislocation.    Procedures      Impression/Plan: This is a 59 y.o. female doing very well 4 months out from her left total knee replacement.  Told her to continue with her activity level and stretching exercises.  As for her right knee I told her that I would be happy to give her injection whenever she is ready.  For now I will give her a prescription of meloxicam because it has helped her out a lot before her left knee replacement.  No need for follow-up for her left knee anymore but she is welcome to come back for any other issue whenever.    BMI Readings from Last 1 Encounters:   11/25/24 33.38 kg/m²      Lab Results   Component Value Date    CREATININE 0.70 11/11/2024     Tobacco Use: Medium Risk (4/3/2025)    Patient History     Smoking Tobacco Use: Former     Smokeless Tobacco Use: Never     Passive Exposure: Not on file      Computed MELD 3.0 unavailable. One or more values for this score either were not found within the given timeframe or did not fit some other criterion.  Computed MELD-Na unavailable. One or more values for this score either were not found within the given timeframe or did not fit some other criterion.       Lab Results   Component Value Date    HGBA1C 5.4 11/11/2024     Lab Results   Component Value Date    STAPHMRSASCR No Staphylococcus aureus isolated 11/11/2024

## (undated) DEVICE — NEEDLE, SPINAL, QUINCKE, 18 G X 3.5 IN, PINK HUB

## (undated) DEVICE — CLOSURE SYSTEM, DERMABOND, PRINEO, 22CM, STERILE

## (undated) DEVICE — SUTURE, CTD, VICRYL, 2-0, UND, BR, CT-2

## (undated) DEVICE — BLADE, OSCILLATOR 19.5 X 86 X 1.27MM

## (undated) DEVICE — APPLICATOR, CHLORAPREP, W/ORANGE TINT, 26ML

## (undated) DEVICE — CUFF, TOURNIQUET, 34 X 4, SNGL PORT/SNGL BLADDER, DISP, LF

## (undated) DEVICE — SOLUTION, IRRIGATION, USP, SODIUM CHLORIDE 0.9%, 3000 ML, BAG

## (undated) DEVICE — DRESSING, MEPILEX BORDER, POST-OP AG, 4 X 10 IN

## (undated) DEVICE — PAD, KNEE PATIENT, DEMAYO, DISP, STERILE

## (undated) DEVICE — SYRINGE, 30 CC, LUER LOCK

## (undated) DEVICE — CATHETER TRAY, SURESTEP, 14FR, PRECONNECTED DRAIN BAG

## (undated) DEVICE — COVER, TABLE, 44X90

## (undated) DEVICE — SUTURE, QUILL, BARBED, PDO, 2, 24 X 24CM, T8 36MM TAPER POINT, 1/2 CIRCLE

## (undated) DEVICE — HOOD, SURGICAL, FLYTE, T7 PLUS, PEEL AWAY SHIELD

## (undated) DEVICE — BOWL, MIXING, BREAKAWAY FEMORAL NOZZLE, ADVANCED, MEDIUM PRESSURIZER, W/180 GM CEMENT CARTRIDGE

## (undated) DEVICE — BLADE, RECIPROCATOR 12.5 X 76 X 0.9MM

## (undated) DEVICE — BANDAGE, ELASTIC,  6 IN X 11 YDS, STERILE, LF

## (undated) DEVICE — DRAPE, SHEET, 17 X 23 IN

## (undated) DEVICE — DRESSING, GAUZE, SPONGE, KERLIX, SUPER, 6 X 6.75 IN, STERILE 10PK

## (undated) DEVICE — IRRIGATION SYSTEM, WOUND, PULSAVAC PLUS

## (undated) DEVICE — SYRINGE, 50 CC, LUER LOCK

## (undated) DEVICE — IRRIGATION SYSTEM, WOUND, SURGIPHOR, 450ML, STERILE

## (undated) DEVICE — SUTURE, V-LOC, 4-0, 23IN, P-12, 90 ABS

## (undated) DEVICE — DRAPE PACK, TOTAL KNEE, CUSTOM, GEAUGA

## (undated) DEVICE — KIT, MINOR, DOUBLE BASIN

## (undated) DEVICE — SUTURE, VICRYL, 1, 24 IN, CTD, UNDYED

## (undated) DEVICE — TIP, SUCTION, YANKAUER, FLEXIBLE